# Patient Record
Sex: FEMALE | Race: WHITE | NOT HISPANIC OR LATINO | Employment: FULL TIME | URBAN - METROPOLITAN AREA
[De-identification: names, ages, dates, MRNs, and addresses within clinical notes are randomized per-mention and may not be internally consistent; named-entity substitution may affect disease eponyms.]

---

## 2023-02-16 PROBLEM — J30.9 ALLERGIC RHINITIS: Status: ACTIVE | Noted: 2023-02-16

## 2023-02-16 PROBLEM — R29.898 WEAKNESS OF LEFT LOWER EXTREMITY: Status: ACTIVE | Noted: 2023-02-16

## 2023-02-16 PROBLEM — F17.200 NICOTINE DEPENDENCE: Status: ACTIVE | Noted: 2023-02-16

## 2023-02-16 PROBLEM — M25.50 ARTHRALGIA OF MULTIPLE JOINTS: Status: ACTIVE | Noted: 2023-02-16

## 2023-02-16 PROBLEM — M25.521 ELBOW PAIN, RIGHT: Status: ACTIVE | Noted: 2023-02-16

## 2023-02-16 PROBLEM — E78.2 HYPERLIPEMIA, MIXED: Status: ACTIVE | Noted: 2023-02-16

## 2023-02-16 PROBLEM — E66.01 CLASS 3 SEVERE OBESITY DUE TO EXCESS CALORIES WITH BODY MASS INDEX (BMI) OF 45.0 TO 49.9 IN ADULT (MULTI): Status: ACTIVE | Noted: 2023-02-16

## 2023-02-16 PROBLEM — M25.562 LEFT KNEE PAIN: Status: ACTIVE | Noted: 2023-02-16

## 2023-02-16 PROBLEM — G47.33 OBSTRUCTIVE SLEEP APNEA: Status: ACTIVE | Noted: 2023-02-16

## 2023-02-16 PROBLEM — R63.2 POLYPHAGIA: Status: ACTIVE | Noted: 2023-02-16

## 2023-02-16 PROBLEM — S59.902A INJURY OF ELBOW, LEFT: Status: ACTIVE | Noted: 2023-02-16

## 2023-02-16 PROBLEM — E66.01 MORBID OBESITY WITH BMI OF 40.0-44.9, ADULT (MULTI): Status: ACTIVE | Noted: 2023-02-16

## 2023-02-16 PROBLEM — D35.00 ADRENAL ADENOMA: Status: ACTIVE | Noted: 2023-02-16

## 2023-02-16 PROBLEM — E66.813 CLASS 3 SEVERE OBESITY DUE TO EXCESS CALORIES WITH BODY MASS INDEX (BMI) OF 45.0 TO 49.9 IN ADULT: Status: ACTIVE | Noted: 2023-02-16

## 2023-02-16 PROBLEM — I28.8 ENLARGED PULMONARY ARTERY (MULTI): Status: ACTIVE | Noted: 2023-02-16

## 2023-02-16 PROBLEM — L30.1 DYSHIDROSIS: Status: ACTIVE | Noted: 2023-02-16

## 2023-02-16 PROBLEM — L20.9 ATOPIC DERMATITIS: Status: ACTIVE | Noted: 2023-02-16

## 2023-02-16 PROBLEM — R91.8 LUNG NODULES: Status: ACTIVE | Noted: 2023-02-16

## 2023-02-16 PROBLEM — E04.1 THYROID NODULE: Status: ACTIVE | Noted: 2023-02-16

## 2023-02-16 PROBLEM — I25.10 CORONARY ATHEROSCLEROSIS: Status: ACTIVE | Noted: 2023-02-16

## 2023-02-16 PROBLEM — R92.8 ABNORMAL MAMMOGRAM OF RIGHT BREAST: Status: ACTIVE | Noted: 2023-02-16

## 2023-02-16 RX ORDER — FLUTICASONE PROPIONATE 50 MCG
2 SPRAY, SUSPENSION (ML) NASAL NIGHTLY
COMMUNITY
Start: 2022-09-07 | End: 2023-10-19

## 2023-02-16 RX ORDER — ATORVASTATIN CALCIUM 10 MG/1
1 TABLET, FILM COATED ORAL DAILY
COMMUNITY
Start: 2022-04-26 | End: 2023-05-30

## 2023-02-16 RX ORDER — MELOXICAM 15 MG/1
1 TABLET ORAL DAILY PRN
COMMUNITY
Start: 2022-08-09 | End: 2023-10-19

## 2023-03-10 ENCOUNTER — APPOINTMENT (OUTPATIENT)
Dept: PRIMARY CARE | Facility: CLINIC | Age: 58
End: 2023-03-10
Payer: COMMERCIAL

## 2023-03-30 ENCOUNTER — OFFICE VISIT (OUTPATIENT)
Dept: PRIMARY CARE | Facility: CLINIC | Age: 58
End: 2023-03-30
Payer: COMMERCIAL

## 2023-03-30 ENCOUNTER — LAB (OUTPATIENT)
Dept: LAB | Facility: LAB | Age: 58
End: 2023-03-30
Payer: COMMERCIAL

## 2023-03-30 VITALS
SYSTOLIC BLOOD PRESSURE: 137 MMHG | HEIGHT: 66 IN | BODY MASS INDEX: 46.28 KG/M2 | DIASTOLIC BLOOD PRESSURE: 86 MMHG | WEIGHT: 288 LBS | TEMPERATURE: 98.3 F | OXYGEN SATURATION: 94 % | HEART RATE: 64 BPM

## 2023-03-30 DIAGNOSIS — G47.33 OBSTRUCTIVE SLEEP APNEA: ICD-10-CM

## 2023-03-30 DIAGNOSIS — E66.01 MORBID OBESITY (MULTI): ICD-10-CM

## 2023-03-30 DIAGNOSIS — Z00.00 ROUTINE GENERAL MEDICAL EXAMINATION AT A HEALTH CARE FACILITY: ICD-10-CM

## 2023-03-30 DIAGNOSIS — Z12.31 VISIT FOR SCREENING MAMMOGRAM: ICD-10-CM

## 2023-03-30 DIAGNOSIS — J30.9 ALLERGIC RHINITIS, UNSPECIFIED SEASONALITY, UNSPECIFIED TRIGGER: Primary | ICD-10-CM

## 2023-03-30 DIAGNOSIS — J30.9 ALLERGIC RHINITIS, UNSPECIFIED SEASONALITY, UNSPECIFIED TRIGGER: ICD-10-CM

## 2023-03-30 DIAGNOSIS — Z12.11 ENCOUNTER FOR COLORECTAL CANCER SCREENING: ICD-10-CM

## 2023-03-30 DIAGNOSIS — F17.210 CIGARETTE NICOTINE DEPENDENCE WITHOUT COMPLICATION: ICD-10-CM

## 2023-03-30 DIAGNOSIS — E78.2 HYPERLIPEMIA, MIXED: ICD-10-CM

## 2023-03-30 DIAGNOSIS — I25.10 ATHEROSCLEROSIS OF NATIVE CORONARY ARTERY OF NATIVE HEART WITHOUT ANGINA PECTORIS: ICD-10-CM

## 2023-03-30 DIAGNOSIS — Z12.12 ENCOUNTER FOR COLORECTAL CANCER SCREENING: ICD-10-CM

## 2023-03-30 DIAGNOSIS — L91.8 ACQUIRED SKIN TAG: ICD-10-CM

## 2023-03-30 DIAGNOSIS — L30.1 DYSHIDROSIS: ICD-10-CM

## 2023-03-30 LAB
ALANINE AMINOTRANSFERASE (SGPT) (U/L) IN SER/PLAS: 14 U/L (ref 7–45)
ALBUMIN (G/DL) IN SER/PLAS: 4 G/DL (ref 3.4–5)
ALKALINE PHOSPHATASE (U/L) IN SER/PLAS: 71 U/L (ref 33–110)
ANION GAP IN SER/PLAS: 11 MMOL/L (ref 10–20)
ASPARTATE AMINOTRANSFERASE (SGOT) (U/L) IN SER/PLAS: 13 U/L (ref 9–39)
BILIRUBIN TOTAL (MG/DL) IN SER/PLAS: 0.3 MG/DL (ref 0–1.2)
CALCIUM (MG/DL) IN SER/PLAS: 8.9 MG/DL (ref 8.6–10.3)
CARBON DIOXIDE, TOTAL (MMOL/L) IN SER/PLAS: 27 MMOL/L (ref 21–32)
CHLORIDE (MMOL/L) IN SER/PLAS: 106 MMOL/L (ref 98–107)
CHOLESTEROL (MG/DL) IN SER/PLAS: 177 MG/DL (ref 0–199)
CHOLESTEROL IN HDL (MG/DL) IN SER/PLAS: 60.1 MG/DL
CHOLESTEROL/HDL RATIO: 2.9
CREATININE (MG/DL) IN SER/PLAS: 0.75 MG/DL (ref 0.5–1.05)
ERYTHROCYTE DISTRIBUTION WIDTH (RATIO) BY AUTOMATED COUNT: 14.9 % (ref 11.5–14.5)
ERYTHROCYTE MEAN CORPUSCULAR HEMOGLOBIN CONCENTRATION (G/DL) BY AUTOMATED: 32.5 G/DL (ref 32–36)
ERYTHROCYTE MEAN CORPUSCULAR VOLUME (FL) BY AUTOMATED COUNT: 92 FL (ref 80–100)
ERYTHROCYTES (10*6/UL) IN BLOOD BY AUTOMATED COUNT: 4.59 X10E12/L (ref 4–5.2)
GFR FEMALE: >90 ML/MIN/1.73M2
GLUCOSE (MG/DL) IN SER/PLAS: 101 MG/DL (ref 74–99)
HEMATOCRIT (%) IN BLOOD BY AUTOMATED COUNT: 42.1 % (ref 36–46)
HEMOGLOBIN (G/DL) IN BLOOD: 13.7 G/DL (ref 12–16)
LDL: 98 MG/DL (ref 0–99)
LEUKOCYTES (10*3/UL) IN BLOOD BY AUTOMATED COUNT: 15.9 X10E9/L (ref 4.4–11.3)
PLATELETS (10*3/UL) IN BLOOD AUTOMATED COUNT: 229 X10E9/L (ref 150–450)
POTASSIUM (MMOL/L) IN SER/PLAS: 4.4 MMOL/L (ref 3.5–5.3)
PROTEIN TOTAL: 6.4 G/DL (ref 6.4–8.2)
SODIUM (MMOL/L) IN SER/PLAS: 140 MMOL/L (ref 136–145)
THYROTROPIN (MIU/L) IN SER/PLAS BY DETECTION LIMIT <= 0.05 MIU/L: 0.57 MIU/L (ref 0.44–3.98)
TRIGLYCERIDE (MG/DL) IN SER/PLAS: 93 MG/DL (ref 0–149)
UREA NITROGEN (MG/DL) IN SER/PLAS: 22 MG/DL (ref 6–23)
VLDL: 19 MG/DL (ref 0–40)

## 2023-03-30 PROCEDURE — 99214 OFFICE O/P EST MOD 30 MIN: CPT | Performed by: FAMILY MEDICINE

## 2023-03-30 PROCEDURE — 80061 LIPID PANEL: CPT

## 2023-03-30 PROCEDURE — 36415 COLL VENOUS BLD VENIPUNCTURE: CPT

## 2023-03-30 PROCEDURE — 99396 PREV VISIT EST AGE 40-64: CPT | Performed by: FAMILY MEDICINE

## 2023-03-30 PROCEDURE — 84443 ASSAY THYROID STIM HORMONE: CPT

## 2023-03-30 PROCEDURE — 80053 COMPREHEN METABOLIC PANEL: CPT

## 2023-03-30 PROCEDURE — 85027 COMPLETE CBC AUTOMATED: CPT

## 2023-03-30 RX ORDER — AMMONIUM LACTATE 12 G/100G
LOTION TOPICAL 2 TIMES DAILY PRN
Qty: 120 ML | Refills: 3 | Status: SHIPPED | OUTPATIENT
Start: 2023-03-30 | End: 2023-10-19

## 2023-03-30 NOTE — PROGRESS NOTES
Subjective   Patient ID: Rashida Whitfield is a 57 y.o. female who presents for Annual Exam. Fasting for labs.    Has skin spot on eyelid   Obstructive sleep apnea: Seeing sleep medicine, on CPAP    Arthralgias, osteoarthritis  Coronary artery disease  on lipitor   Tobacco abuse    Allergic rhinitis    Anxiety lots of stress at work feels overwhelming with work and personal life   Overeating a lot , seeing counseling   No depression   Prozac, zoloft hasn't work in past     Working on cpap trying to get back on it ,  trouble with it     Smoking 1/2 ppd   No CP or sob        Dry skin on hands and and feet,  several creams in past       ROS :    Any eye problems:    N  Frequent nasal congestion or sneezing:  N  Difficulty hearing:  N  Ear problems:   N  Asthma or wheezing:   N  Frequent cough:   N  Shortness of breath:N  Hemoptysis: N  Hx of TB: N  High blood pressure: N  Heart disease: N  Heart murmur:y  Chest pain or pressure with exertion:N  Leg pains with walking up hill: N  Fast heartbeat or palpitations:N  Varicose veins: y  Difficulty swallowing foods or liquids: N  Abdominal pains: N  Frequent indigestion or heartburn: y  Constipation: y  Diarrhea or loose stools: N  Weight changes recently: y  Change in bowel movements: N  An ulcer: N  Black stools: N  Jaundice, hepatitis or liver problems: N  Gallstones or gallbladder problems: N  Stomach or intestinal problems: N  Vomited blood : N  Blood in bowel movements: N  Sickle cell trait  or Anemia: N  Been refused as a blood donor: N  Problems with her kidney, bladder, or prostate: N  Loss of control of your urine: y  Pain or burning with urination: N  Blood in her urine: N  Trouble starting flow of urine: N  Frequent urination at night: y  History of venereal disease: N  Any skin problems: y  Diabetes: N  Thyroid disease: N  Frequent back pain: N  Pain or swelling around joints: y  Broken any bones: y  Frequent headaches: y  Dizziness: N  Have you ever had Seizures or  convulsions: N  Have you ever temporarily lost control of your hand or foot : N   Had a stroke or been paralyzed : N  Temporarily lost your ability to speak: N  Fainted or lost consciousness: N  Hallucinations: N  Nervousness: y  Do you take medications for your nerves: N  Trouble falling asleep or staying asleep: y  Do you feel tired even after a good night sleep: y  Do you feel down in the dumps or depressed: N  Frequent crying: N  Using alcohol excessively: N  Any street drug use : N  Do have any other medical problems that are concerns : y growth on left eyelid and anxiety    Review of Systems    Objective   There were no vitals taken for this visit.    Physical Exam  Constitutional:       General: She is not in acute distress.     Appearance: Normal appearance.   HENT:      Head: Normocephalic and atraumatic.      Right Ear: Tympanic membrane and ear canal normal.      Left Ear: Tympanic membrane and ear canal normal.      Mouth/Throat:      Mouth: Mucous membranes are moist.   Eyes:      Conjunctiva/sclera: Conjunctivae normal.      Pupils: Pupils are equal, round, and reactive to light.      Comments: Left upper lid with skin tag on border    Neck:      Vascular: No carotid bruit.   Cardiovascular:      Rate and Rhythm: Normal rate and regular rhythm.      Heart sounds: No murmur heard.  Pulmonary:      Effort: Pulmonary effort is normal.      Breath sounds: Normal breath sounds. No wheezing or rhonchi.   Abdominal:      General: Bowel sounds are normal.      Palpations: Abdomen is soft.   Musculoskeletal:         General: No swelling.      Cervical back: No rigidity.   Lymphadenopathy:      Cervical: No cervical adenopathy.   Skin:     General: Skin is warm and dry.      Findings: No rash.   Neurological:      Mental Status: She is alert.         Assessment/Plan   Problem List Items Addressed This Visit       Allergic rhinitis - Primary    Coronary atherosclerosis    Dyshidrosis    Hyperlipemia, mixed     Nicotine dependence    Obstructive sleep apnea     Other Visit Diagnoses       Routine general medical examination at a health care facility        Morbid obesity (CMS/[facility])        Visit for screening mammogram        Acquired skin tag

## 2023-03-30 NOTE — PATIENT INSTRUCTIONS
Recommend Dr Neftali Henderson :  for skin tag eyelids     Get your blood work as ordered.  You should hear from our office with results whether they are normal are not within a few days.  Please call the office if you do not hear from us.     You should be getting cardiovascular exercise 3-5 times per week for 30-45 minutes.  This includes exercises such as running, brisk walking, biking or swimming.    CT screening:  I discussed the patient's smoking history and risk for lung cancer.  I discussed the CT scan for screening.  Including discussion of [place] and benefits of screening, follow-up diagnostic testing, and benefit of early detection.  The CAT scan of the lungs oftentimes will  cancer earlier and may result any better outcome than waiting for a lung cancer becomes symptomatic.  There are sometimes however people that do have false positive tests as well.  I gave the patient order for the CAT scan for screening purposes.  Determined the patient fits in the criteria based on the age range and smoking history to receive the CT     Quit Smoking   It is very important that you quit smoking.  Even if you have smoked for a long time there are still many physical benefits to stopping smoking.  There are also numerous conditions that are worsened by smoking such as cancer, lung disease, emphysema, and heart disease to name a few.  Pick a date to quit.  On your quit date, get rid of everything that has to do with smoking.   If you were given a medication to quit smoking, such as Chantix or Wellbutrin it is recommended to start the week prior to quitting.  If you are using nicotine replacement to quit smoking, it is very important you do not smoke while you have a patch on or if using nicotine gum.  That can give you a toxic amount of nicotine in your system and can be dangerous to your heart.  Avoid social settings where you may be around smokers.  Get your family and friends' support.  Call the [place] Tobacco Quit  Line at 1-453.781.8265 for help and guidance.  You can quit smoking if you really want to.     It is important that you get your weight under control.  There are several things you can do to try and lose weight.  You should be getting 30-45 minutes of cardiovascular exercise 3-5 times per week, activities such as running and jogging treadmill swimming and brisk walking are helpful.  You will also need to watch your portion control, decrease calorie intake overall.  Following a low carbohydrate diet is the most successful way to lose weight and take it off long-term.     In order to achieve weight loss it is important that you track exactly what your calorie intake is during the day.  I usually recommend doing some sort of formal program or [place]ia. there are lot of good Apps out there to help you follow your calorie intake such as weight watchers, Noom, Fitbit.  If you have a BMI  (height per weight ratio)  that is over 40 or over 35 with risk factors such as diabetes, heart disease, high blood pressure or hypertension you may qualify for more intensive weight loss options.   has a comprehensive weight loss program which I can give you a referral for to aggressively work on weight loss through a dietitian, possible medications and possible surgical interventions.  There are some weight loss medications that do work well such as the GLP2 medications  : Saxenda and Wegovy, these medications are injectables but worked very well  Another medication called Contrave can help with weight loss.  I would recommend checking with your insurance to see what the cost of these medications would be for you and let me know if you would like to proceed with these medications.     Anxiety: Continue counseling, consider psychiatry if medications needed

## 2023-03-31 ENCOUNTER — TELEPHONE (OUTPATIENT)
Dept: PRIMARY CARE | Facility: CLINIC | Age: 58
End: 2023-03-31
Payer: COMMERCIAL

## 2023-03-31 DIAGNOSIS — D72.829 LEUKOCYTOSIS, UNSPECIFIED TYPE: Primary | ICD-10-CM

## 2023-03-31 NOTE — RESULT ENCOUNTER NOTE
Please notify pt of lab results she has an elevated white blood cell count which usually goes along with infection, let me know if any signs of infection, sometimes this can be from inflammation I want a repeat a CBC next week  Rest of blood work generally looks good: Blood count, thyroid, cholesterol

## 2023-03-31 NOTE — TELEPHONE ENCOUNTER
----- Message from Yin Hamm MD sent at 3/31/2023  7:49 AM EDT -----  Please notify pt of lab results she has an elevated white blood cell count which usually goes along with infection, let me know if any signs of infection, sometimes this can be from inflammation I want a repeat a CBC next week  Rest of blood work generally looks good: Blood count, thyroid, cholesterol

## 2023-03-31 NOTE — TELEPHONE ENCOUNTER
Pt informed of lab results.  Pt WBC are elevated, pt does feel like she is getting sick.  Pt will call on Monday and will have repeat lab in 1 week.

## 2023-04-05 ENCOUNTER — LAB (OUTPATIENT)
Dept: LAB | Facility: LAB | Age: 58
End: 2023-04-05
Payer: COMMERCIAL

## 2023-04-05 ENCOUNTER — TELEPHONE (OUTPATIENT)
Dept: PRIMARY CARE | Facility: CLINIC | Age: 58
End: 2023-04-05

## 2023-04-05 DIAGNOSIS — D72.829 LEUKOCYTOSIS, UNSPECIFIED TYPE: ICD-10-CM

## 2023-04-05 LAB
ERYTHROCYTE DISTRIBUTION WIDTH (RATIO) BY AUTOMATED COUNT: 14.7 % (ref 11.5–14.5)
ERYTHROCYTE MEAN CORPUSCULAR HEMOGLOBIN CONCENTRATION (G/DL) BY AUTOMATED: 32.6 G/DL (ref 32–36)
ERYTHROCYTE MEAN CORPUSCULAR VOLUME (FL) BY AUTOMATED COUNT: 93 FL (ref 80–100)
ERYTHROCYTES (10*6/UL) IN BLOOD BY AUTOMATED COUNT: 4.52 X10E12/L (ref 4–5.2)
HEMATOCRIT (%) IN BLOOD BY AUTOMATED COUNT: 42 % (ref 36–46)
HEMOGLOBIN (G/DL) IN BLOOD: 13.7 G/DL (ref 12–16)
LEUKOCYTES (10*3/UL) IN BLOOD BY AUTOMATED COUNT: 9.3 X10E9/L (ref 4.4–11.3)
PLATELETS (10*3/UL) IN BLOOD AUTOMATED COUNT: 239 X10E9/L (ref 150–450)

## 2023-04-05 PROCEDURE — 36415 COLL VENOUS BLD VENIPUNCTURE: CPT

## 2023-04-05 PROCEDURE — 85027 COMPLETE CBC AUTOMATED: CPT

## 2023-04-05 NOTE — TELEPHONE ENCOUNTER
Result Communication    Resulted Orders   CBC   Result Value Ref Range    WBC 9.3 4.4 - 11.3 x10E9/L    RBC 4.52 4.00 - 5.20 x10E12/L    Hemoglobin 13.7 12.0 - 16.0 g/dL    Hematocrit 42.0 36.0 - 46.0 %    MCV 93 80 - 100 fL    MCHC 32.6 32.0 - 36.0 g/dL    Platelets 239 150 - 450 x10E9/L    RDW 14.7 (H) 11.5 - 14.5 %       4:46 PM      Results were successfully communicated with the patient and they acknowledged their understanding.

## 2023-04-20 ENCOUNTER — TELEPHONE (OUTPATIENT)
Dept: PRIMARY CARE | Facility: CLINIC | Age: 58
End: 2023-04-20
Payer: COMMERCIAL

## 2023-04-20 LAB — NONINV COLON CA DNA+OCC BLD SCRN STL QL: NEGATIVE

## 2023-04-20 NOTE — TELEPHONE ENCOUNTER
Result Communication    Resulted Orders   Cologuard® colon cancer screening   Result Value Ref Range    NONINV COLON CA DNA+OCC BLD SCRN STL QL Negative Negative      Comment:        NEGATIVE TEST RESULT. A negative Cologuard result indicates a low likelihood that a colorectal cancer (CRC) or advanced adenoma (adenomatous polyps with more advanced pre-malignant features)  is present. The chance that a person with a negative Cologuard test has a colorectal cancer is less than 1 in 1500 (negative predictive value >99.9%) or has an  advanced adenoma is less than  5.3% (negative predictive value 94.7%). These data are based on a prospective cross-sectional study of 10,000 individuals at average risk for colorectal cancer who were screened with both Cologuard and colonoscopy. (Valerie ELIZONDO. et al, N Engl J Med 2014;370(14):1756-4762) The normal value (reference range) for this assay is negative.    COLOGUARD RE-SCREENING RECOMMENDATION: Periodic colorectal cancer screening is an important part of preventive healthcare for asymptomatic individuals at average risk for colorectal cancer.  Following a negative Cologuard result, the American Cancer Society and U.S.  Multi-Society Task Force screening guidelines recommend a Cologuard re-screening interval of 3 years.   References: American Cancer Society Guideline for Colorectal Cancer Screening: https://www.cancer.org/cancer/colon-rectal-cancer/wtgasyvjm-wjoahvklm-qejebcm/acs-recommendations.html.; Bahman IQBAL, Mary MAYS, Renetta STONEK, Colorectal Cancer Screening: Recommendations for Physicians and Patients from the U.S. Multi-Society Task Force on Colorectal Cancer Screening , Am J Gastroenterology 2017; 112:1465-3824.    TEST DESCRIPTION: Composite algorithmic analysis of stool DNA-biomarkers with hemoglobin immunoassay.   Quantitative values of individual biomarkers are not reportable and are not associated with individual biomarker result reference ranges. Cologuard is intended  for colorectal cancer screening of adults of either sex, 45 years or older, who are at average-risk for colorectal cancer (CRC). Cologuard has been approved for use by the U.S. FDA. The performance of Cologuard was  established in a cross sectional study of average-risk adults aged 50-84. Cologuard performance in patients ages 45 to 49 years was estimated by sub-group analysis of near-age groups. Colonoscopies performed for a positive result may find as the most clinically significant lesion: colorectal cancer [4.0%], advanced adenoma (including sessile serrated polyps greater than or equal to 1cm diameter) [20%] or non- advanced adenoma [31%]; or no colorectal neoplasia [45%]. These estimates are derived from a prospective cross-sectional screening study of 10,000 individuals at average risk for colorectal cancer who were screened with both Cologuard and colonoscopy. (Valerie Oliveira al, N Engl J Med 2014;370(14):5341-4725.) Cologuard may produce a false negative or false positive result (no colorectal cancer or precancerous polyp present at colonoscopy follow up). A negative Cologuard test result does not guarantee the absence of CRC or advanced adenoma (pre-cancer). The current Cologuard  screening interval is every 3 years. (American Cancer Society and U.S. Multi-Society Task Force). Cologuard performance data in a 10,000 patient pivotal study using colonoscopy as the reference method can be accessed at the following location: [email or web address].[email or web address]/results. Additional description of the Cologuard test process, warnings and precautions can be found at www.[employer]mn[employer].com.         2:32 PM      Results were successfully communicated with the patient and they acknowledged their understanding.

## 2023-05-30 DIAGNOSIS — E78.2 HYPERLIPEMIA, MIXED: Primary | ICD-10-CM

## 2023-05-30 PROBLEM — H02.826: Status: ACTIVE | Noted: 2023-05-30

## 2023-05-30 PROBLEM — J32.9 CHRONIC SINUSITIS: Status: ACTIVE | Noted: 2023-05-30

## 2023-05-30 RX ORDER — ATORVASTATIN CALCIUM 10 MG/1
10 TABLET, FILM COATED ORAL DAILY
Qty: 90 TABLET | Refills: 2 | Status: SHIPPED | OUTPATIENT
Start: 2023-05-30 | End: 2024-03-04

## 2023-06-02 ENCOUNTER — TELEPHONE (OUTPATIENT)
Dept: PRIMARY CARE | Facility: CLINIC | Age: 58
End: 2023-06-02
Payer: COMMERCIAL

## 2023-06-02 DIAGNOSIS — R93.89 ABNORMAL CHEST CT: Primary | ICD-10-CM

## 2023-06-02 NOTE — RESULT ENCOUNTER NOTE
Please notify pt of radiology results: There are no concerning lung nodules, she does have emphysema, needs to quit smoking  There is a little asymmetry on her trachea, this could be a little fold of tissue but it needs close follow-up to make sure it resolves, she needs a repeat CAT scan of the chest in 3 months, order inputted  Let me know if she has any questions

## 2023-06-02 NOTE — TELEPHONE ENCOUNTER
----- Message from Yin Hamm MD sent at 6/2/2023 11:03 AM EDT -----  Please notify pt of radiology results: There are no concerning lung nodules, she does have emphysema, needs to quit smoking  There is a little asymmetry on her trachea, this could be a little fold of tissue but it needs close follow-up to make sure it resolves, she needs a repeat CAT scan of the chest in 3 months, order inputted  Let me know if she has any questions

## 2023-06-02 NOTE — TELEPHONE ENCOUNTER
Result Communication    Resulted Orders   CT lung screening low dose    Narrative    Interpreted By:  CHANDNI GARCIA MD  MRN: [id number]  Patient Name: MARIA EUGENIA PASTOR     STUDY:  TH CT CHEST LOW DOSE FOR LUNG SCREENING WO CONTRAST;  6/1/2023 8:54 am     INDICATION:  screen.     COMPARISON:  None.     ACCESSION NUMBER(S):  [id number]     ORDERING CLINICIAN:  NICOLE COOPER     TECHNIQUE:  Helical data acquisition of the chest was obtained without IV  contrast material.  Images were reformatted in axial, coronal, and  sagittal planes.     FINDINGS:  LUNGS AND AIRWAYS:  There is asymmetricright lateral tracheal wall thickening.     There is similar bilateral upper lung predominant centrilobular and  paraseptal emphysema.There is no focal consolidation, pleural  effusion, or pneumothorax.     There are no suspicious parenchymal lung nodules.        MEDIASTINUM AND YARED, LOWER NECK AND AXILLA:  The visualized thyroid gland is within normal limits.  No evidence of thoracic lymphadenopathy by CT criteria.  Esophagus appears within normal limits as seen.     HEART AND VESSELS:  There is mild atherosclerotic calcification of the thoracic aorta.  Main pulmonary artery and its branches are normal in caliber.  There is moderate coronary artery calcifications.  The cardiac chambers are not enlarged.  There is no pericardial effusion seen.     UPPER ABDOMEN:  There is stable gland enlargement consistent with adrenal adenoma.           CHEST WALL AND OSSEOUS STRUCTURES:  Chest wall is within normal limits.  No acute osseous pathology.There are no suspicious osseous lesions.       Impression    1. There are no suspicious parenchymal lung nodules.  2. There is subtle asymmetric right lateral wall tracheal thickening  which may represent edema versus mucosal redundancy recommend a  three-month low-dose chest CT to confirm benign nature of this finding     COMMUNICATION  A critical result communication was sent to Dr. Cooper on  06/01/2023  at 8:06 p.m. using the [employer] system.        LUNG RADS CATEGORY:  Lung-RADS 4A, NO PET CT (Suspicious): Follow up in 3 months,  CT  Chest Lung Ca Screen Follow up LR3/LR4 DIAGNOSTIC Low Dose,  recommended as per American College of Radiology Guidelines Lung-RADS  Version 2022. Recommend F/U with Pulmonologist.          2:21 PM      Results were successfully communicated with the patient and they acknowledged their understanding.

## 2023-06-15 ENCOUNTER — OFFICE VISIT (OUTPATIENT)
Dept: PRIMARY CARE | Facility: CLINIC | Age: 58
End: 2023-06-15
Payer: COMMERCIAL

## 2023-06-15 VITALS
SYSTOLIC BLOOD PRESSURE: 119 MMHG | WEIGHT: 293 LBS | HEIGHT: 66 IN | OXYGEN SATURATION: 94 % | DIASTOLIC BLOOD PRESSURE: 79 MMHG | BODY MASS INDEX: 47.09 KG/M2 | TEMPERATURE: 98.2 F | HEART RATE: 84 BPM

## 2023-06-15 DIAGNOSIS — E78.2 HYPERLIPEMIA, MIXED: Primary | ICD-10-CM

## 2023-06-15 DIAGNOSIS — R73.9 HYPERGLYCEMIA: ICD-10-CM

## 2023-06-15 DIAGNOSIS — E66.01 CLASS 3 SEVERE OBESITY DUE TO EXCESS CALORIES WITH SERIOUS COMORBIDITY AND BODY MASS INDEX (BMI) OF 45.0 TO 49.9 IN ADULT (MULTI): ICD-10-CM

## 2023-06-15 DIAGNOSIS — Z13.820 SCREENING FOR OSTEOPOROSIS: ICD-10-CM

## 2023-06-15 DIAGNOSIS — E88.810 METABOLIC SYNDROME: ICD-10-CM

## 2023-06-15 PROBLEM — I28.8 ENLARGED PULMONARY ARTERY (MULTI): Status: RESOLVED | Noted: 2023-02-16 | Resolved: 2023-06-15

## 2023-06-15 LAB — POC HEMOGLOBIN A1C: 6.1 % (ref 4.2–6.5)

## 2023-06-15 PROCEDURE — 83036 HEMOGLOBIN GLYCOSYLATED A1C: CPT | Performed by: FAMILY MEDICINE

## 2023-06-15 PROCEDURE — 3008F BODY MASS INDEX DOCD: CPT | Performed by: FAMILY MEDICINE

## 2023-06-15 PROCEDURE — 99214 OFFICE O/P EST MOD 30 MIN: CPT | Performed by: FAMILY MEDICINE

## 2023-06-15 NOTE — PROGRESS NOTES
"Subjective   Patient ID: Rashida Whitfield is a 57 y.o. female who presents for Discuss meds for weight loss. Seeing Dr. Johnson for her left knee/ leg pain; advised she needed to lose weight. Scheduled for a possible cortisone injection on 6/28/23. In so much pain with her left leg.     Patient having left knee pain, seeing orthopedics  Is getting an injection  Needs knee replacement in both knees   Has to lose weight to get knee replacement   Just lost 10 #     Having a lot of pain   Was taking mobic     Has done weight watchers in past,   Hard time cooking for herself     Needs to lose weight to help with the knee pain         Review of Systems    Objective   Ht 1.676 m (5' 6\")   Wt 134 kg (294 lb 6 oz)   BMI 47.51 kg/m²     Physical Exam  Constitutional:       Appearance: Normal appearance. She is well-developed.   Cardiovascular:      Rate and Rhythm: Normal rate and regular rhythm.      Heart sounds: Normal heart sounds. No murmur heard.  Pulmonary:      Effort: Pulmonary effort is normal.      Breath sounds: Normal breath sounds.   Neurological:      General: No focal deficit present.      Mental Status: She is alert.         Assessment/Plan   Problem List Items Addressed This Visit       Hyperlipemia, mixed - Primary    Class 3 severe obesity due to excess calories with body mass index (BMI) of 45.0 to 49.9 in adult (CMS/HCC)          "

## 2023-07-13 PROBLEM — M25.561 RIGHT KNEE PAIN: Status: ACTIVE | Noted: 2023-07-13

## 2023-07-14 ENCOUNTER — OFFICE VISIT (OUTPATIENT)
Dept: PRIMARY CARE | Facility: CLINIC | Age: 58
End: 2023-07-14
Payer: COMMERCIAL

## 2023-07-14 VITALS
TEMPERATURE: 98.3 F | SYSTOLIC BLOOD PRESSURE: 123 MMHG | OXYGEN SATURATION: 95 % | HEIGHT: 66 IN | HEART RATE: 66 BPM | DIASTOLIC BLOOD PRESSURE: 83 MMHG | BODY MASS INDEX: 45 KG/M2 | WEIGHT: 280 LBS

## 2023-07-14 DIAGNOSIS — R19.7 DIARRHEA, UNSPECIFIED TYPE: ICD-10-CM

## 2023-07-14 DIAGNOSIS — E88.810 METABOLIC SYNDROME: ICD-10-CM

## 2023-07-14 DIAGNOSIS — E66.01 MORBID OBESITY (MULTI): ICD-10-CM

## 2023-07-14 DIAGNOSIS — E66.01 CLASS 3 SEVERE OBESITY DUE TO EXCESS CALORIES WITH SERIOUS COMORBIDITY AND BODY MASS INDEX (BMI) OF 45.0 TO 49.9 IN ADULT (MULTI): Primary | ICD-10-CM

## 2023-07-14 PROCEDURE — 99213 OFFICE O/P EST LOW 20 MIN: CPT | Performed by: FAMILY MEDICINE

## 2023-07-14 PROCEDURE — 3008F BODY MASS INDEX DOCD: CPT | Performed by: FAMILY MEDICINE

## 2023-07-14 NOTE — PATIENT INSTRUCTIONS
GLP-1 medications are medications that work to help you lose weight.  Most commonly prescribed are Rybelsus, Mounjaro, Wegovy.  These medications work to cause weight loss through decreased appetite as well as improving metabolism and slowing down your GI tract.  These are indicated if you have a BMI of 30 or greater, or a BMI over 27 with health related complications such as diabetes, high blood pressure, high cholesterol or sleep apnea.  These medications are an injection form and a pen which can be injected into the thighs or abdomen.  They commonly can cause decreased appetite or nausea.  Sometimes they can cause abdominal pain.  It is important to eat a healthy diet while you are taking these medications, if you eat large meals or high fat meals such as fried food it will increase the likelihood of nausea, vomiting or severe abdominal pain.  Stop the medications if you have significant side effects.  It is important that you see your doctor regularly.  Usually with these medications you need to be seen monthly for evaluation of side effects and weight checks.

## 2023-07-14 NOTE — PROGRESS NOTES
"Subjective   Patient ID: Rashida Whitfield is a 57 y.o. female who presents for One month med review since starting the Ozempic. States she developed HA, and abdominal pains in addition to indigestion. Appetite has decreased. Pt also states she may have caught a virus this Monday; had diarrhea pretty bad for the day. Pt has found upon eating greasy fried food she does not feel well since being on the Ozempic.     Needs knee replacement   Got cortisone shot   Took 4 days   Diarrhea this week , getting  better     Some nausea   Stomach tightness          Review of Systems    Objective   /83   Pulse 66   Temp 36.8 °C (98.3 °F)   Ht 1.676 m (5' 6\")   Wt 127 kg (280 lb)   SpO2 95%   BMI 45.19 kg/m²     Physical Exam  Constitutional:       Appearance: Normal appearance. She is well-developed.   Cardiovascular:      Rate and Rhythm: Normal rate and regular rhythm.      Heart sounds: No murmur heard.  Neurological:      General: No focal deficit present.      Mental Status: She is alert.         Assessment/Plan   Problem List Items Addressed This Visit       Class 3 severe obesity due to excess calories with body mass index (BMI) of 45.0 to 49.9 in adult (CMS/HCC) - Primary     Other Visit Diagnoses       Morbid obesity (CMS/[facility])        Metabolic syndrome        Diarrhea, unspecified type                   "

## 2023-07-21 ENCOUNTER — TELEPHONE (OUTPATIENT)
Dept: PRIMARY CARE | Facility: CLINIC | Age: 58
End: 2023-07-21
Payer: COMMERCIAL

## 2023-07-21 NOTE — RESULT ENCOUNTER NOTE
Your mammogram results were normal.  Follow-up with yearly mammogram or as directed by your doctor.  You have dense breast tissue.  This makes the accuracy of mammogram a little lower with regards to picking up breast cancer.  It is recommended that you consider getting another evaluation for your breasts call the fast MRI.  This is a quick MRI study that looks at the breast and is more accurate than the mammogram but does not replace routine mammograms.  This test is self-pay, prices are usually around $250.   You can get this at several  facilities including [facility].  If you're interested please let us know and we can input an order for you.

## 2023-07-21 NOTE — TELEPHONE ENCOUNTER
Result Communication    Resulted Orders   XR DEXA bone density    Narrative    Interpreted By:  NINA ORTIZ MD  MRN: [id number]  Patient Name: MARIA EUGENIA PASTOR     STUDY:  BONE DENSITY, DEXA 1 OR MORE SITES: AXIAL SKELETN7/20/2023 10:09 am     INDICATION:  screen.  The patient is a 58 y/o  year old F.     COMPARISON:  None.     ACCESSION NUMBER(S):  [id number]     ORDERING CLINICIAN:  NICOLE COOPER     TECHNIQUE:  BONE DENSITY, DEXA 1 OR MORE SITES: AXIAL SKELETN     FINDINGS:  SPINE L1-L4  Bone Mineral Density: 1.048  T-Score 0.0  Z-Score 1.3  Classification:  Normal  Bone Mineral Density change vs baseline:  Not reported  Bone Mineral Density change vs previous: Not reported     LEFT FEMUR -TOTAL  Bone Mineral Density: 1.196  T-Score 2.1   Z-Score  2.9  Classification:  Normal  Bone Mineral Density change vs baseline: Not reported  Bone Mineral Density change vs previous: Not reported     LEFT FEMUR -NECK  Bone Mineral Density: 0.863  T-Score 0.1  Z-Score 1.3  Classification:  Normal        World Health Organization (WHO) criteria for post-menopausal,   Women:  Normal:         T-score at or above -1 SD  Osteopenia:   T-score between -1 and -2.5 SD  Osteoporosis: T-score at or below -2.5 SD        10-year Fracture Risk:  Major Osteoporotic Fracture  Not reported  Hip Fracture                        Not reported     Note:  If no FRAX score is reported, it is because:  Some T-score for Spine Total or Hip Total or Femoral Neck at or below  -2.5     Vertebral Deformity Assessment: Exam Date - 7/20/2023 10:09 am  -----------------------------------------------------------------  Vertebral Level                      Impression  -----------------------------------------------------------------  T4                                        Not reported  T5                                        Not reported  T6                                        Not reported  T7                                        Not  reported  T8                                        Not reported  T9                                        Not reported  T10                                      Not reported  T11                                      Not reported  T12                                      Not reported  L1                                        Not reported  L2                                        Not reported  L3                                        Not reported  L4                                        Not reported  -----------------------------------------------------------------  A spine fracture indicates 5X risk for subsequent spine fracture  and 2X risk for subsequent hip fracture.        This exam was performed at [facility].  Patient ID:[id number]  YOB: 1965  Height:170.18  Gender:F  Exam Date:7/20/2023  Weight:127.614819  Indications:screen       Impression    DEXA: Spine According to World Health Organization criteria,  classification is  normal.     Followup recommended in 2 years or sooner as clinically warranted.     DEXA: Left hip According to World Health Organization criteria,  classification is  normal.     Followup recommended in 2 years or sooner as clinically warranted.     VFA:  NO VERTEBRAL FRACTURES WERE SEEN.     All images and detailed analysis are available on the  Radiology  PACS.       10:52 AM      Results were successfully communicated with the patient and they acknowledged their understanding.

## 2023-08-14 DIAGNOSIS — E88.810 METABOLIC SYNDROME: ICD-10-CM

## 2023-08-14 DIAGNOSIS — E66.01 CLASS 3 SEVERE OBESITY DUE TO EXCESS CALORIES WITH SERIOUS COMORBIDITY AND BODY MASS INDEX (BMI) OF 45.0 TO 49.9 IN ADULT (MULTI): ICD-10-CM

## 2023-08-14 DIAGNOSIS — R73.9 HYPERGLYCEMIA: ICD-10-CM

## 2023-08-16 RX ORDER — SEMAGLUTIDE 0.68 MG/ML
INJECTION, SOLUTION SUBCUTANEOUS
Qty: 3 ML | Refills: 0 | Status: SHIPPED | OUTPATIENT
Start: 2023-08-16 | End: 2023-10-19

## 2023-08-22 ENCOUNTER — TELEPHONE (OUTPATIENT)
Dept: PRIMARY CARE | Facility: CLINIC | Age: 58
End: 2023-08-22
Payer: COMMERCIAL

## 2023-08-28 ENCOUNTER — TELEPHONE (OUTPATIENT)
Dept: PRIMARY CARE | Facility: CLINIC | Age: 58
End: 2023-08-28

## 2023-08-28 NOTE — TELEPHONE ENCOUNTER
PT states the insurance is not covering the 3rd refill of the Ozempic. Pt states she is suppose to increase to .5mg and pt is asking if the prior auth has been completed. Please advise pt.

## 2023-09-14 ENCOUNTER — TELEPHONE (OUTPATIENT)
Dept: PRIMARY CARE | Facility: CLINIC | Age: 58
End: 2023-09-14
Payer: COMMERCIAL

## 2023-09-14 NOTE — TELEPHONE ENCOUNTER
Result Communication    Resulted Orders   CT lung screening follow up CT chest wo IV contrast    Narrative    Interpreted By:  CHANDNI GARCIA MD  MRN: [id number]  Patient Name: MARIA EUGENIA PASTOR     STUDY:  CT CHEST LUNG CA SCREEN FOLLOW UP LR3/LR4  DIAGNOSTIC LOW DOSE;  9/12/2023 9:04 am     INDICATION:  repeat abnl trachea.     COMPARISON:  None.     ACCESSION NUMBER(S):  [id number]     ORDERING CLINICIAN:  NICOLE COOPER     TECHNIQUE:  Helical data acquisition of the chest was obtained without IV  contrast material.  Images were reformatted in axial, coronal, and  sagittal planes.     FINDINGS:  LUNGS AND AIRWAYS:  The trachea and central airways are patent. No endobronchial lesion  is seen.The previously identified right tracheal thickening as  resolved. There is linear filling defects within the trachea  consistent with adherent mucus.     There is a stable right upper lobe emphysematous bullous/bleb.There  is no focal consolidation, pleural effusion, or pneumothorax.     There is mild peribronchial thickening.  There are no suspicious parenchymal lung nodules.     MEDIASTINUM AND YARED, LOWER NECK AND AXILLA:  The visualized thyroid gland is within normal limits.  Scattered mediastinal lymph nodes are felt to be reactive in nature.  Esophagus appears within normal limits as seen.     HEART AND VESSELS:  The thoracic aorta normal in course and caliber.  There is mild prominence of the central pulmonary arteries.  There are mild-to-moderate calcifications of the coronary arteries  The cardiac chambers are not enlarged.  There is no pericardial effusion seen.     UPPER ABDOMEN:  Low-attenuation adrenal adenoma again noted.           CHEST WALL AND OSSEOUS STRUCTURES:  Chest wall is within normal limits.  No acute osseous pathology.There are no suspicious osseous lesions.       Impression    1. There are no suspicious parenchymal lung nodules.  2. Resolution of the previously described right lateral tracheal  thickening  probably represents resolving adherent mucus recommend  return to screening with 1 year low-dose chest CT for surveillance.        LUNG RADS CATEGORY:  Lung-RADS 2 (Benign Appearance or Indolent behavior): Continue with  annual screening in 12 months,  CT Chest Lung Ca Screen Initial  or Follow up LR1/LR2/Continuation of Screening Low Dose recommended  as per American College of Radiology Guidelines Lung-RADS Version  2022.          3:50 PM  Martínez Martinez MD  [place] [id number] Clinical Support Staff  Good result  Some things have resolved and there are no new spots noted  Recheck another CT scan in 1 year    Results were successfully communicated with the patient and they acknowledged their understanding.

## 2023-10-19 ENCOUNTER — OFFICE VISIT (OUTPATIENT)
Dept: ORTHOPEDIC SURGERY | Facility: CLINIC | Age: 58
End: 2023-10-19
Payer: COMMERCIAL

## 2023-10-19 DIAGNOSIS — G89.29 CHRONIC PAIN OF LEFT KNEE: ICD-10-CM

## 2023-10-19 DIAGNOSIS — M25.561 CHRONIC PAIN OF RIGHT KNEE: Primary | ICD-10-CM

## 2023-10-19 DIAGNOSIS — G89.29 CHRONIC PAIN OF RIGHT KNEE: Primary | ICD-10-CM

## 2023-10-19 DIAGNOSIS — M25.562 CHRONIC PAIN OF LEFT KNEE: ICD-10-CM

## 2023-10-19 PROCEDURE — 3008F BODY MASS INDEX DOCD: CPT | Performed by: ORTHOPAEDIC SURGERY

## 2023-10-19 PROCEDURE — 20610 DRAIN/INJ JOINT/BURSA W/O US: CPT | Performed by: ORTHOPAEDIC SURGERY

## 2023-10-19 PROCEDURE — 99214 OFFICE O/P EST MOD 30 MIN: CPT | Performed by: ORTHOPAEDIC SURGERY

## 2023-10-19 NOTE — PROGRESS NOTES
This is a consultation from Dr. Yin Hamm MD for   Chief Complaint   Patient presents with    Left Knee - Pain       This is a 58 y.o. female who presents for follow-up for her bilateral knee pain.  Patient has bilateral knee arthritis, she had cortisone injections about 6 months ago.  He gave her excellent relief.  Since then she had return of her symptoms on last few weeks, exacerbation of her pain.  Left knee is worse than right.  Sharp stabbing pain over the medial knee, gets worse if she walks long distances or stands for long time.  No numbness or tingling no fevers or chills no other issues    Physical Exam    There has been no interval change in this patient's past medical, surgical, medications, allergies, family history or social history since the most recent visit to a provider within our department. 14 point review of systems was performed, reviewed, and negative except for pertinent positives documented in the history of present illness.     Constitutional: well developed, well nourished female in no acute distress  Psychiatric: normal mood, appropriate affect  Eyes: sclera anicteric  HENT: normocephalic/atraumatic  CV: regular rate and rhythm   Respiratory: non labored breathing  Integumentary: no rash  Neurological: moves all extremities    Bilateral knee exam: skin intact no lacerations or abrations.  1+ effusion.  Tender medial joint line. negative log roll negative patellar grind. ROM 0-120. stable to varus and valgus stress at 0 and 30 degrees. negative lachman negative posterior drawer negative nidhi. 5/5 ehl/fhl/gs/ta. silt s/s/sp/dp/t. 2+ dp/pt        Xrays were ordered by me, they were reviewed and independently interpreted by me today, they show severe degenerative disease left knee more moderate disease right knee    Procedure Note:    Diagnosis: bilateral knee arthritis  Procedure: bilateral knee injection    Verbal consent was obtained from the patient, risks benefits and  alternatives were discussed. We discussed the risks and benefits and potential morbidity related to the treatment, and to the prescription medication administered in the injectionA timeout was performed, and the correct patient and site of injection were identified and verified. The lateral side of the knee was sterilized with Betadine, and anesthetized with ethyl chloride spray. The bilateral knee was injected with 1ml kenalog and 4ml lidocaine in the usual fashion. A sterile Band-Aid was placed. The patient tolerated the procedure well with no immediate complications. Standard post injection precautions and instructions were given.        Procedures      Impression/Plan: This is a 58 y.o. female with bilateral knee arthritis.  I had an in depth discussion with the patient regarding treatment options for arthritis and their relative risks and benefits. We reviewed surgical and nonsurgical option for treatment. Treatments include anti inflammatory medications, physical therapy, weight loss, activity modification, use of assistive devices, injection therapies. We discussed current prescriptions and risks and benefits of continuation of prescription medication as apporpriate. We discussed that arthritis is often progressive over time, an in end stage arthritis surgical interventions can be considered, including arthroplasty. All questions were answered and the patient voiced their understanding.  I will see her back as needed    BMI Readings from Last 1 Encounters:   07/14/23 45.19 kg/m²      Lab Results   Component Value Date    CREATININE 0.75 03/30/2023     Tobacco Use: High Risk (10/19/2023)    Patient History     Smoking Tobacco Use: Every Day     Smokeless Tobacco Use: Never     Passive Exposure: Not on file      Computed MELD 3.0 unavailable. Necessary lab results were not found in the last year.  Computed MELD-Na unavailable. Necessary lab results were not found in the last year.       Lab Results   Component  "Value Date    [facility] 6.1 06/15/2023     No results found for: \"STAPHMRSASCR\"  "

## 2023-10-31 ENCOUNTER — TELEMEDICINE (OUTPATIENT)
Dept: PRIMARY CARE | Facility: CLINIC | Age: 58
End: 2023-10-31
Payer: COMMERCIAL

## 2023-10-31 DIAGNOSIS — U07.1 COVID-19: Primary | ICD-10-CM

## 2023-10-31 PROCEDURE — 99213 OFFICE O/P EST LOW 20 MIN: CPT | Performed by: FAMILY MEDICINE

## 2023-10-31 ASSESSMENT — ENCOUNTER SYMPTOMS
FEVER: 0
SHORTNESS OF BREATH: 0
FATIGUE: 1
WHEEZING: 0

## 2023-10-31 NOTE — PATIENT INSTRUCTIONS
discussed the symptoms of  COVID with the patient  :    Symptoms include: Fever, shortness of breath , cough, diarrhea, loss of sense of smell, muscle aches  Generally treatment is symptomatic: Tylenol, push fluids, rest  Call the office or seek treatment with worsening symptoms.  If has shortness of breath worsens recommend going to ER  Older folks and people at higher risk may benefit from antiviral medication  The current recommendation is to quarantine for 5 days and then if you are fever free you can come out of quarantine but you need to continue to mask for 5 days  Similar recommendations for close household contacts     Discussed pros cons of paxlovid with pt

## 2023-10-31 NOTE — PROGRESS NOTES
Subjective   Patient ID: Rashida Whitfield is a 58 y.o. female who presents for covid positive. Sx's onset last Friday the day after having her covid and flu vaccines. Sx's began with fatigue, then pt developed cold sx's. Thought she was experiencing allergies. Home test positive; was at a  prior to the onset of sx's and was informed that three people were covid positive. Taking OTC Mucinex and generic Zyrtec.     Sick over the last 4 days   Deep breathing more     Minimal cough          Review of Systems   Constitutional:  Positive for fatigue. Negative for fever.   HENT:  Positive for congestion.    Respiratory:  Negative for shortness of breath and wheezing.        Objective   There were no vitals taken for this visit.    Physical Exam    Pt alert and oriented nontoxic   No tachypnea     Assessment/Plan   Problem List Items Addressed This Visit    None  Visit Diagnoses         Codes    COVID-19    -  Primary U07.1               
145

## 2023-12-11 RX ORDER — TRIAMCINOLONE ACETONIDE 40 MG/ML
40 INJECTION, SUSPENSION INTRA-ARTICULAR; INTRAMUSCULAR ONCE
Status: COMPLETED | OUTPATIENT
Start: 2023-12-11 | End: 2023-12-11

## 2023-12-11 RX ADMIN — TRIAMCINOLONE ACETONIDE 40 MG: 40 INJECTION, SUSPENSION INTRA-ARTICULAR; INTRAMUSCULAR at 17:47

## 2024-02-20 ENCOUNTER — APPOINTMENT (OUTPATIENT)
Dept: ORTHOPEDIC SURGERY | Facility: CLINIC | Age: 59
End: 2024-02-20
Payer: COMMERCIAL

## 2024-03-02 DIAGNOSIS — E78.2 HYPERLIPEMIA, MIXED: ICD-10-CM

## 2024-03-04 DIAGNOSIS — E78.2 HYPERLIPEMIA, MIXED: ICD-10-CM

## 2024-03-04 RX ORDER — ATORVASTATIN CALCIUM 10 MG/1
10 TABLET, FILM COATED ORAL DAILY
Qty: 90 TABLET | Refills: 0 | Status: SHIPPED | OUTPATIENT
Start: 2024-03-04 | End: 2024-06-04

## 2024-03-04 RX ORDER — ATORVASTATIN CALCIUM 10 MG/1
10 TABLET, FILM COATED ORAL DAILY
Qty: 90 TABLET | Refills: 0 | OUTPATIENT
Start: 2024-03-04

## 2024-03-19 ENCOUNTER — OFFICE VISIT (OUTPATIENT)
Dept: ORTHOPEDIC SURGERY | Facility: CLINIC | Age: 59
End: 2024-03-19
Payer: COMMERCIAL

## 2024-03-19 ENCOUNTER — HOSPITAL ENCOUNTER (OUTPATIENT)
Dept: RADIOLOGY | Facility: HOSPITAL | Age: 59
Discharge: HOME | End: 2024-03-19
Payer: COMMERCIAL

## 2024-03-19 VITALS — HEIGHT: 67 IN | WEIGHT: 270 LBS | BODY MASS INDEX: 42.38 KG/M2

## 2024-03-19 DIAGNOSIS — M25.562 CHRONIC PAIN OF LEFT KNEE: Primary | ICD-10-CM

## 2024-03-19 DIAGNOSIS — G89.29 CHRONIC PAIN OF RIGHT KNEE: ICD-10-CM

## 2024-03-19 DIAGNOSIS — G89.29 CHRONIC PAIN OF LEFT KNEE: Primary | ICD-10-CM

## 2024-03-19 DIAGNOSIS — M25.562 CHRONIC PAIN OF LEFT KNEE: ICD-10-CM

## 2024-03-19 DIAGNOSIS — M25.561 CHRONIC PAIN OF RIGHT KNEE: ICD-10-CM

## 2024-03-19 DIAGNOSIS — G89.29 CHRONIC PAIN OF LEFT KNEE: ICD-10-CM

## 2024-03-19 PROCEDURE — 73562 X-RAY EXAM OF KNEE 3: CPT | Mod: RIGHT SIDE | Performed by: RADIOLOGY

## 2024-03-19 PROCEDURE — 20610 DRAIN/INJ JOINT/BURSA W/O US: CPT | Performed by: ORTHOPAEDIC SURGERY

## 2024-03-19 PROCEDURE — 73560 X-RAY EXAM OF KNEE 1 OR 2: CPT | Mod: LT

## 2024-03-19 PROCEDURE — 73560 X-RAY EXAM OF KNEE 1 OR 2: CPT | Mod: RIGHT SIDE | Performed by: RADIOLOGY

## 2024-03-19 PROCEDURE — 73562 X-RAY EXAM OF KNEE 3: CPT | Mod: RT

## 2024-03-19 PROCEDURE — 3008F BODY MASS INDEX DOCD: CPT | Performed by: ORTHOPAEDIC SURGERY

## 2024-03-19 PROCEDURE — 99214 OFFICE O/P EST MOD 30 MIN: CPT | Performed by: ORTHOPAEDIC SURGERY

## 2024-03-19 RX ORDER — TRIAMCINOLONE ACETONIDE 40 MG/ML
1 INJECTION, SUSPENSION INTRA-ARTICULAR; INTRAMUSCULAR
Status: COMPLETED | OUTPATIENT
Start: 2024-03-19 | End: 2024-03-19

## 2024-03-19 RX ADMIN — TRIAMCINOLONE ACETONIDE 1 ML: 40 INJECTION, SUSPENSION INTRA-ARTICULAR; INTRAMUSCULAR at 09:45

## 2024-03-19 NOTE — PROGRESS NOTES
This is a consultation from Dr. Yin Hamm MD for   Chief Complaint   Patient presents with    Left Knee - Pain       This is a 58 y.o. female who presents for follow-up for her bilateral knees.  Patient has bilateral knee arthritis, she cortisone injections about 5 months ago.  They gave her good relief.  Since then she has had return of her symptoms in the past 1 to 2 weeks.  She exacerbation of her pain, sharp pain mainly over the medial knee worse on the left than the right.  No numbness or tingling.  She is still able to get out and do most of her activities, she walks her dog several miles each day.    Physical Exam    There has been no interval change in this patient's past medical, surgical, medications, allergies, family history or social history since the most recent visit to a provider within our department. 14 point review of systems was performed, reviewed, and negative except for pertinent positives documented in the history of present illness.     Constitutional: well developed, well nourished female in no acute distress  Psychiatric: normal mood, appropriate affect  Eyes: sclera anicteric  HENT: normocephalic/atraumatic  CV: regular rate and rhythm   Respiratory: non labored breathing  Integumentary: no rash  Neurological: moves all extremities    Bilateral knee exam: skin intact no lacerations or abrations.  1+ effusion.  Tender medial joint line. negative log roll negative patellar grind. ROM 0-120. stable to varus and valgus stress at 0 and 30 degrees. negative lachman negative posterior drawer negative nidhi. 5/5 ehl/fhl/gs/ta. silt s/s/sp/dp/t. 2+ dp/pt        Xrays were ordered by me, they were reviewed and independently interpreted by me today, they show severe degenerative disease in the left, moderate disease of the right    L Inj/Asp: bilateral knee on 3/19/2024 9:45 AM  Indications: pain and joint swelling  Details: 22 G needle, anterolateral approach  Medications (Right): 1 mL  triamcinolone acetonide 40 mg/mL  Medications (Left): 1 mL triamcinolone acetonide 40 mg/mL    Discussion:  I discussed the conservative treatment options for knee osteoarthritis including but not limited to physical therapy, oral NSAIDS, activity and lifestyle modification, and corticosteroid injections. Pt has elected to undergo a cortisone injection today. I have explained the risk and benefits of an injection including the possibility of joint infection, bleeding, damage to cartilage, allergic reaction. Patient verbalized understanding and gave verbal consent wishes to proceed with a intra-articular cortisone injection for their knee.    Procedure:  After discussing the risk and benefits of the procedure, we proceeded with an intra-articular bilateral knee injection. We discussed the risks and benefits and potential morbidity related to the treatment, and to the prescription medication administered in the injection    With the patient's informed verbal consent, the bilateral knees were prepped in standard sterile fashion with Chlorhexidine. The skin was then anesthetized with ethyl chloride spray and cleaned again with Chlorhexidine. The bilateral knees were then apirated/injected with a prefilled 20-gauge syringe of 40 mg Kenalog + 4 ml Lidocaine using the lateral approach without complications.  The patient tolerated this well and felt immediate initial relief of symptoms. A bandaid was applied and the patient ambulated out of the clinic on ther own accord without difficulty. Patient was instructed to avoid physical activity for 24-48 hours to prevent the knees from swelling and may ice the knees as tolerated. Patient should contact the office if any signs of of infection appear: redness, fever, chills, drainage, swelling or warmth to the knees.  Pt understands that the injections can be repeated no sooner than 3 months.      Procedure, treatment alternatives, risks and benefits explained, specific risks  "discussed. Consent was given by the patient. Immediately prior to procedure a time out was called to verify the correct patient, procedure, equipment, support staff and site/side marked as required. Patient was prepped and draped in the usual sterile fashion.             Impression/Plan: This is a 58 y.o. female with bilateral knee arthritis.  I had an in depth discussion with the patient regarding treatment options for arthritis and their relative risks and benefits. We reviewed surgical and nonsurgical option for treatment. Treatments include anti inflammatory medications, physical therapy, weight loss, activity modification, use of assistive devices, injection therapies. We discussed current prescriptions and risks and benefits of continuation of prescription medication as apporpriate. We discussed that arthritis is often progressive over time, an in end stage arthritis surgical interventions can be considered, including arthroplasty. All questions were answered and the patient voiced their understanding.  Doing well with nonsurgical treatment I will see her back as needed    BMI Readings from Last 1 Encounters:   03/19/24 42.29 kg/m²      Lab Results   Component Value Date    CREATININE 0.75 03/30/2023     Tobacco Use: High Risk (3/19/2024)    Patient History     Smoking Tobacco Use: Every Day     Smokeless Tobacco Use: Never     Passive Exposure: Not on file      Computed MELD 3.0 unavailable. Necessary lab results were not found in the last year.  Computed MELD-Na unavailable. Necessary lab results were not found in the last year.       Lab Results   Component Value Date    [facility] 6.1 06/15/2023     No results found for: \"STAPHMRSASCR\"  "

## 2024-03-26 ENCOUNTER — OFFICE VISIT (OUTPATIENT)
Dept: PRIMARY CARE | Facility: CLINIC | Age: 59
End: 2024-03-26
Payer: COMMERCIAL

## 2024-03-26 VITALS
WEIGHT: 283 LBS | HEIGHT: 67 IN | BODY MASS INDEX: 44.42 KG/M2 | DIASTOLIC BLOOD PRESSURE: 76 MMHG | SYSTOLIC BLOOD PRESSURE: 132 MMHG | HEART RATE: 69 BPM | TEMPERATURE: 98.5 F | OXYGEN SATURATION: 93 %

## 2024-03-26 DIAGNOSIS — E78.2 HYPERLIPEMIA, MIXED: ICD-10-CM

## 2024-03-26 DIAGNOSIS — R87.610 ASCUS OF CERVIX WITH NEGATIVE HIGH RISK HPV: ICD-10-CM

## 2024-03-26 DIAGNOSIS — Z00.00 ROUTINE GENERAL MEDICAL EXAMINATION AT A HEALTH CARE FACILITY: Primary | ICD-10-CM

## 2024-03-26 DIAGNOSIS — Z12.31 ENCOUNTER FOR SCREENING MAMMOGRAM FOR MALIGNANT NEOPLASM OF BREAST: ICD-10-CM

## 2024-03-26 DIAGNOSIS — R53.83 FATIGUE, UNSPECIFIED TYPE: ICD-10-CM

## 2024-03-26 DIAGNOSIS — E66.01 MORBID OBESITY WITH BMI OF 40.0-44.9, ADULT (MULTI): ICD-10-CM

## 2024-03-26 DIAGNOSIS — R73.9 HYPERGLYCEMIA: ICD-10-CM

## 2024-03-26 DIAGNOSIS — I25.10 ATHEROSCLEROSIS OF NATIVE CORONARY ARTERY OF NATIVE HEART WITHOUT ANGINA PECTORIS: ICD-10-CM

## 2024-03-26 DIAGNOSIS — G47.33 OBSTRUCTIVE SLEEP APNEA: ICD-10-CM

## 2024-03-26 PROCEDURE — 99396 PREV VISIT EST AGE 40-64: CPT | Performed by: FAMILY MEDICINE

## 2024-03-26 PROCEDURE — 4004F PT TOBACCO SCREEN RCVD TLK: CPT | Performed by: FAMILY MEDICINE

## 2024-03-26 PROCEDURE — 3008F BODY MASS INDEX DOCD: CPT | Performed by: FAMILY MEDICINE

## 2024-03-26 PROCEDURE — 87624 HPV HI-RISK TYP POOLED RSLT: CPT

## 2024-03-26 PROCEDURE — 88175 CYTOPATH C/V AUTO FLUID REDO: CPT

## 2024-03-26 ASSESSMENT — PATIENT HEALTH QUESTIONNAIRE - PHQ9
2. FEELING DOWN, DEPRESSED OR HOPELESS: NOT AT ALL
SUM OF ALL RESPONSES TO PHQ9 QUESTIONS 1 AND 2: 0
1. LITTLE INTEREST OR PLEASURE IN DOING THINGS: NOT AT ALL

## 2024-03-26 ASSESSMENT — ENCOUNTER SYMPTOMS: SHORTNESS OF BREATH: 0

## 2024-03-26 NOTE — PROGRESS NOTES
Subjective   Pt is here for a CPE.     Hyperlipidemia   Coronary artery disease  on lipitor    Some anxiety    Does counseling in past   Meds hasn't helped   Trouble sleeping at night   Eating at night , lack of motivation     Exercising   No CHEST PAIN or SHORTNESS OF BREATH     Knee shots by orthopedics     Working on cpap trying to get back on it ,  trouble with it      Smoking 1/2 ppd still   No CP or sob     Obstructive sleep apnea    Was on ozempic and insurance stopped paying for it     UTD with colon      ROS :  ( No or Yes )  Any eye problems:    Y  Frequent nasal congestion or sneezing:  Y  Difficulty hearing:  N  Ear problems:   N  Asthma or wheezing:   N  Frequent cough:   N  Shortness of breath:N  Hemoptysis: N  Hx of TB: N  High blood pressure: N  Heart disease: N  Heart murmur:Y  Chest pain or pressure with exertion:N  Leg pains with walking up hill: N  Fast heartbeat or palpitations:N  Varicose veins: N  Difficulty swallowing foods or liquids: N  Abdominal pains: N  Frequent indigestion or heartburn: Y  Constipation: N  Diarrhea or loose stools: N  Weight changes recently: N  Change in bowel movements: N  An ulcer: N  Black stools: N  Jaundice, hepatitis or liver problems: N  Gallstones or gallbladder problems: N  Stomach or intestinal problems: N  Vomited blood : N  Blood in bowel movements: N  Sickle cell trait  or Anemia: N  Been refused as a blood donor: N  Problems with her kidney, bladder, or prostate: N  Loss of control of your urine: y  Pain or burning with urination: N  Blood in her urine: N  Trouble starting flow of urine: N  Frequent urination at night: Y  History of venereal disease: N  Any skin problems: N  Diabetes: N  Thyroid disease: N  Frequent back pain: N  Pain or swelling around joints: Y  Broken any bones: Y  Frequent headaches: N  Dizziness: N  Have you ever had Seizures or convulsions: N  Have you ever temporarily lost control of your hand or foot : N   Had a stroke or been  "paralyzed : N  Temporarily lost your ability to speak: N  Fainted or lost consciousness: N  Hallucinations: N  Nervousness: N  Do you take medications for your nerves: N  Trouble falling asleep or staying asleep: y  Do you feel tired even after a good night sleep: y  Do you feel down in the dumps or depressed: N  Frequent crying: N  Using alcohol excessively: N  Any street drug use : N  Do have any other medical problems that are concerns :      Review of Systems   Respiratory:  Negative for shortness of breath.    Cardiovascular:  Negative for chest pain.       Objective   /76   Pulse 69   Temp 36.9 °C (98.5 °F)   Ht 1.702 m (5' 7\")   Wt 128 kg (283 lb)   SpO2 93%   BMI 44.32 kg/m²     Physical Exam  Constitutional:       General: She is not in acute distress.     Appearance: Normal appearance.   HENT:      Head: Normocephalic and atraumatic.      Right Ear: Tympanic membrane, ear canal and external ear normal.      Left Ear: Tympanic membrane, ear canal and external ear normal.      Nose: Nose normal. No congestion or rhinorrhea.      Mouth/Throat:      Mouth: Mucous membranes are moist.      Pharynx: No oropharyngeal exudate or posterior oropharyngeal erythema.   Eyes:      Extraocular Movements: Extraocular movements intact.      Conjunctiva/sclera: Conjunctivae normal.      Pupils: Pupils are equal, round, and reactive to light.   Neck:      Vascular: No carotid bruit.   Cardiovascular:      Rate and Rhythm: Normal rate and regular rhythm.      Heart sounds: No murmur heard.  Pulmonary:      Effort: Pulmonary effort is normal.      Breath sounds: Normal breath sounds. No wheezing or rhonchi.   Chest:   Breasts:     Right: Normal.      Left: Normal.   Abdominal:      General: Bowel sounds are normal.      Palpations: Abdomen is soft.   Genitourinary:     General: Normal vulva.      Vagina: Normal.      Cervix: Normal.      Uterus: Normal.       Adnexa: Right adnexa normal and left adnexa normal. "   Musculoskeletal:         General: No swelling.      Cervical back: No rigidity.      Right lower leg: No edema.      Left lower leg: No edema.   Lymphadenopathy:      Cervical: No cervical adenopathy.      Upper Body:      Right upper body: No axillary adenopathy.      Left upper body: No axillary adenopathy.   Skin:     General: Skin is warm and dry.      Findings: No rash.   Neurological:      General: No focal deficit present.      Mental Status: She is alert and oriented to person, place, and time.      Cranial Nerves: No cranial nerve deficit.      Gait: Gait normal.      Deep Tendon Reflexes: Reflexes normal.   Psychiatric:         Mood and Affect: Mood normal.         Behavior: Behavior normal.         Assessment/Plan   Problem List Items Addressed This Visit             ICD-10-CM    Coronary atherosclerosis I25.10    Relevant Orders    CBC    Comprehensive Metabolic Panel    Lipid Panel    Thyroid Stimulating Hormone    Hemoglobin A1C    Vitamin B12    Vitamin D 25-Hydroxy,Total (for eval of Vitamin D levels)    Hyperlipemia, mixed E78.2    Relevant Orders    CBC    Comprehensive Metabolic Panel    Lipid Panel    Thyroid Stimulating Hormone    Hemoglobin A1C    Vitamin B12    Vitamin D 25-Hydroxy,Total (for eval of Vitamin D levels)    Obstructive sleep apnea G47.33    Relevant Orders    CBC    Comprehensive Metabolic Panel    Lipid Panel    Thyroid Stimulating Hormone    Hemoglobin A1C    Vitamin B12    Vitamin D 25-Hydroxy,Total (for eval of Vitamin D levels)    Morbid obesity with BMI of 40.0-44.9, adult (CMS/[facility]) E66.01, Z68.41    Relevant Orders    CBC    Comprehensive Metabolic Panel    Lipid Panel    Thyroid Stimulating Hormone    Hemoglobin A1C    Vitamin B12    Vitamin D 25-Hydroxy,Total (for eval of Vitamin D levels)     Other Visit Diagnoses         Codes    Routine general medical examination at a health care facility    -  Primary Z00.00    Relevant Orders    CBC    Comprehensive Metabolic  Panel    Lipid Panel    Thyroid Stimulating Hormone    Hemoglobin A1C    Vitamin B12    Vitamin D 25-Hydroxy,Total (for eval of Vitamin D levels)    Hyperglycemia     R73.9    Relevant Orders    CBC    Comprehensive Metabolic Panel    Lipid Panel    Thyroid Stimulating Hormone    Hemoglobin A1C    Vitamin B12    Vitamin D 25-Hydroxy,Total (for eval of Vitamin D levels)    ASCUS of cervix with negative high risk HPV     R87.610    Relevant Orders    THINPREP PAP TEST    CBC    Comprehensive Metabolic Panel    Lipid Panel    Thyroid Stimulating Hormone    Hemoglobin A1C    Vitamin B12    Vitamin D 25-Hydroxy,Total (for eval of Vitamin D levels)    Encounter for screening mammogram for malignant neoplasm of breast     Z12.31    Relevant Orders    BI mammo bilateral screening tomosynthesis    CBC    Comprehensive Metabolic Panel    Lipid Panel    Thyroid Stimulating Hormone    Hemoglobin A1C    Vitamin B12    Vitamin D 25-Hydroxy,Total (for eval of Vitamin D levels)    Fatigue, unspecified type     R53.83    Relevant Orders    Vitamin B12    Vitamin D 25-Hydroxy,Total (for eval of Vitamin D levels)

## 2024-03-26 NOTE — PATIENT INSTRUCTIONS
Get your blood work as ordered.  You should hear from our office with results whether they are normal are not within a few days.  Please call the office if you do not hear from us.     After you get testing done you will get notified from our office with regards to your results whether they are normal or not.  If you are registered in the electronic health record we will send you information in that system.  If you have any questions or need clarification please feel free to call the office.     I discussed anxiety with the patient: She is not really interested in medications, not really interested in therapy      You should be getting cardiovascular exercise 3-5 times per week for 30-45 minutes.  This includes exercises such as running, brisk walking, biking or swimming.       It is important to actively try to reduce your weight to become healthier.  There are several things you can do to try and lose weight.  You should be getting 30-45 minutes of cardiovascular exercise 3-5 times per week, activities such as running and jogging treadmill swimming and brisk walking are helpful.  You will also need to watch your portion control, decrease calorie intake overall.  Following a low carbohydrate diet is the most successful way to lose weight and take it off long-term.  In order to achieve weight loss it is important that you track exactly what your calorie intake is during the day.  I usually recommend doing some sort of formal program or Belia. there are lot of good Apps out there to help you follow your calorie intake such as weight watchers, Noom, Fitbit.  It is recommended that you reduce the intake of sweets, sugar, reduce carbohydrate intake.  Healthy diets with more whole grains, vegetables, fruits, nuts and seeds with plant oils are healthier diets than animal-based fats.  Reduce your intake of white bread, grains, potatoes, processed foods.     Quit Smoking   It is very important that you quit smoking.  Even if  you have smoked for a long time there are still many physical benefits to stopping smoking.  There are also numerous conditions that are worsened by smoking such as cancer, lung disease, emphysema, and heart disease to name a few.  Pick a date to quit.  On your quit date, get rid of everything that has to do with smoking.   If you were given a medication to quit smoking, such as Chantix or Wellbutrin it is recommended to start the week prior to quitting.  If you are using nicotine replacement to quit smoking, it is very important you do not smoke while you have a patch on or if using nicotine gum.  That can give you a toxic amount of nicotine in your system and can be dangerous to your heart.  Avoid social settings where you may be around smokers.  Get your family and friends' support.  Call the [place] Tobacco Quit Line at 1-626.434.7839 for help and guidance.  You can quit smoking if you really want to.      If you have a BMI  (height per weight ratio)  that is > 30 or > 27  with risk factors such as diabetes, heart disease, high blood pressure or hypertension you may qualify for more intensive weight loss options.   I can give you a referral for to aggressively work on weight loss through a dietitian, possible medications and possible surgical interventions.  There are also a few options for weight loss medications.  There is an over-the-counter medication called orlistat ( Jah ) this works to prevent fat absorption, it can cause some diarrhea.  There are a couple of prescription medications that sometimes can help with weight loss: Bupropion, topiramate, Contrave  The newest class of medications that are indicated for weight loss are the GLP2 medications  : Mounjaro, Saxenda , Wegovy and Zepbound , these medications are injectables.  In order to proceed with getting a prescription for one of these medications it is your responsibility to check with your insurance to see what the cost of these medications would be  for you and let me know if you would like to proceed with these medications.  If we do start these medications you will need to follow-up in a month for weight checks.  These medications  get you started on weight loss but you will need to continue with behavioral changes, dietary management and exercise to continue to keep the weight off.    If you are interested in trying 1 of these medications you need to check with your insurance formulary to see if they are covered.  If your insurance does not specifically cover the drug name Mounjaro, Saxenda,Wegovy or Zepbound WITHOUT a prior authorization then it is not covered for weight loss.    Ozempic,Semaglutide, Rybelsus, Trulicity are diabetic medications, not weight loss medications.  If your insurance states they need a prior authorization then they do not cover it for weight loss . We will not do prior authorizations for weight loss.  [employer] has a savings program online for  Zepbound on their website which is about half the cost of paying cash for these medications.  There are other options that may be more cost effective if your insurance company does not cover the medication.  There are some online pharmacies that are providing medications as well as some local weight loss clinics that provide the GLP-1 medications that may be cheaper out-of-pocket.    There is significant safety concerns about compounded injectable GLP-1 medications.  The ingredients may be coming from unknown sources so I would not recommend these.  There are some pharmacies that offer compounded sublingual semaglutide.  If you can find this medication supplied by a [facility] approved pharmacy that is using the FDA approved semaglutide this is a safer bet.    If you do start 1 of these medications you will need to see me every 2 to 3 months for weight checks and reevaluations.

## 2024-04-04 ENCOUNTER — LAB (OUTPATIENT)
Dept: LAB | Facility: LAB | Age: 59
End: 2024-04-04
Payer: COMMERCIAL

## 2024-04-04 DIAGNOSIS — E78.2 HYPERLIPEMIA, MIXED: ICD-10-CM

## 2024-04-04 DIAGNOSIS — Z12.31 ENCOUNTER FOR SCREENING MAMMOGRAM FOR MALIGNANT NEOPLASM OF BREAST: ICD-10-CM

## 2024-04-04 DIAGNOSIS — E66.01 MORBID OBESITY WITH BMI OF 40.0-44.9, ADULT (MULTI): ICD-10-CM

## 2024-04-04 DIAGNOSIS — R53.83 FATIGUE, UNSPECIFIED TYPE: ICD-10-CM

## 2024-04-04 DIAGNOSIS — I25.10 ATHEROSCLEROSIS OF NATIVE CORONARY ARTERY OF NATIVE HEART WITHOUT ANGINA PECTORIS: ICD-10-CM

## 2024-04-04 DIAGNOSIS — G47.33 OBSTRUCTIVE SLEEP APNEA: ICD-10-CM

## 2024-04-04 DIAGNOSIS — R73.9 HYPERGLYCEMIA: ICD-10-CM

## 2024-04-04 DIAGNOSIS — Z00.00 ROUTINE GENERAL MEDICAL EXAMINATION AT A HEALTH CARE FACILITY: ICD-10-CM

## 2024-04-04 DIAGNOSIS — R87.610 ASCUS OF CERVIX WITH NEGATIVE HIGH RISK HPV: ICD-10-CM

## 2024-04-04 LAB
25(OH)D3 SERPL-MCNC: 17 NG/ML (ref 30–100)
ALBUMIN SERPL BCP-MCNC: 3.9 G/DL (ref 3.4–5)
ALP SERPL-CCNC: 81 U/L (ref 33–110)
ALT SERPL W P-5'-P-CCNC: 10 U/L (ref 7–45)
ANION GAP SERPL CALC-SCNC: 11 MMOL/L (ref 10–20)
AST SERPL W P-5'-P-CCNC: 12 U/L (ref 9–39)
BILIRUB SERPL-MCNC: 0.4 MG/DL (ref 0–1.2)
BUN SERPL-MCNC: 15 MG/DL (ref 6–23)
CALCIUM SERPL-MCNC: 9.1 MG/DL (ref 8.6–10.3)
CHLORIDE SERPL-SCNC: 105 MMOL/L (ref 98–107)
CHOLEST SERPL-MCNC: 180 MG/DL (ref 0–199)
CHOLESTEROL/HDL RATIO: 3.2
CO2 SERPL-SCNC: 30 MMOL/L (ref 21–32)
CREAT SERPL-MCNC: 0.77 MG/DL (ref 0.5–1.05)
EGFRCR SERPLBLD CKD-EPI 2021: 90 ML/MIN/1.73M*2
ERYTHROCYTE [DISTWIDTH] IN BLOOD BY AUTOMATED COUNT: 14.1 % (ref 11.5–14.5)
EST. AVERAGE GLUCOSE BLD GHB EST-MCNC: 126 MG/DL
GLUCOSE SERPL-MCNC: 98 MG/DL (ref 74–99)
HBA1C MFR BLD: 6 %
HCT VFR BLD AUTO: 44.4 % (ref 36–46)
HDLC SERPL-MCNC: 55.4 MG/DL
HGB BLD-MCNC: 14.1 G/DL (ref 12–16)
LDLC SERPL CALC-MCNC: 109 MG/DL
MCH RBC QN AUTO: 30.2 PG (ref 26–34)
MCHC RBC AUTO-ENTMCNC: 31.8 G/DL (ref 32–36)
MCV RBC AUTO: 95 FL (ref 80–100)
NON HDL CHOLESTEROL: 125 MG/DL (ref 0–149)
NRBC BLD-RTO: 0 /100 WBCS (ref 0–0)
PLATELET # BLD AUTO: 227 X10*3/UL (ref 150–450)
POTASSIUM SERPL-SCNC: 4.6 MMOL/L (ref 3.5–5.3)
PROT SERPL-MCNC: 6.1 G/DL (ref 6.4–8.2)
RBC # BLD AUTO: 4.67 X10*6/UL (ref 4–5.2)
SODIUM SERPL-SCNC: 141 MMOL/L (ref 136–145)
TRIGL SERPL-MCNC: 78 MG/DL (ref 0–149)
TSH SERPL-ACNC: 2.41 MIU/L (ref 0.44–3.98)
VIT B12 SERPL-MCNC: 376 PG/ML (ref 211–911)
VLDL: 16 MG/DL (ref 0–40)
WBC # BLD AUTO: 8.4 X10*3/UL (ref 4.4–11.3)

## 2024-04-04 PROCEDURE — 36415 COLL VENOUS BLD VENIPUNCTURE: CPT

## 2024-04-04 PROCEDURE — 82607 VITAMIN B-12: CPT

## 2024-04-04 PROCEDURE — 83036 HEMOGLOBIN GLYCOSYLATED A1C: CPT

## 2024-04-04 PROCEDURE — 85027 COMPLETE CBC AUTOMATED: CPT

## 2024-04-04 PROCEDURE — 82306 VITAMIN D 25 HYDROXY: CPT

## 2024-04-04 PROCEDURE — 80061 LIPID PANEL: CPT

## 2024-04-04 PROCEDURE — 80053 COMPREHEN METABOLIC PANEL: CPT

## 2024-04-04 PROCEDURE — 84443 ASSAY THYROID STIM HORMONE: CPT

## 2024-04-27 NOTE — PATIENT INSTRUCTIONS
You should be getting cardiovascular exercise 3-5 times per week for 30-45 minutes.  This includes exercises such as running, brisk walking, biking or swimming.       It is important to actively try to reduce your weight to become healthier.  There are several things you can do to try and lose weight.  You should be getting 30-45 minutes of cardiovascular exercise 3-5 times per week, activities such as running and jogging treadmill swimming and brisk walking are helpful.  You will also need to watch your portion control, decrease calorie intake overall.  Following a low carbohydrate diet is the most successful way to lose weight and take it off long-term.  In order to achieve weight loss it is important that you track exactly what your calorie intake is during the day.  I usually recommend doing some sort of formal program or Belia. there are lot of good Apps out there to help you follow your calorie intake such as weight watchers, Noom, Fitbit.  It is recommended that you reduce the intake of sweets, sugar, reduce carbohydrate intake.  Healthy diets with more whole grains, vegetables, fruits, nuts and seeds with plant oils are healthier diets than animal-based fats.  Reduce your intake of white bread, grains, potatoes, processed foods.       If you have a BMI  (height per weight ratio)  that is > 30 or > 27  with risk factors such as diabetes, heart disease, high blood pressure or hypertension you may qualify for more intensive weight loss options.   has a comprehensive weight loss program which I can give you a referral for to aggressively work on weight loss through a dietitian, possible medications and possible surgical interventions.  There are also a few options for weight loss medications.  There is an over-the-counter medication called orlistat ( Jah ) this works to prevent fat absorption, it can cause some diarrhea.  There are a couple of prescription medications that sometimes can help with weight  Patient stated that she does not want to be woken up again for vital signs.     loss: Bupropion, topiramate, Contrave  The newest class of medications that are indicated for weight loss are the GLP2 medications  : Mounjaro, Saxenda and Wegovy, these medications are injectables but worked very well  I would recommend checking with your insurance to see what the cost of these medications would be for you and let me know if you would like to proceed with these medications.  If we do start these medications you will need to follow-up in a month for weight checks.  These medications are not long-term solutions, rather they get you started on weight loss.  You will need to continue with behavioral changes, dietary management and exercise to continue to keep the weight off       Due for repeat CT chest orders in the computer

## 2024-04-30 ENCOUNTER — APPOINTMENT (OUTPATIENT)
Dept: PRIMARY CARE | Facility: CLINIC | Age: 59
End: 2024-04-30
Payer: COMMERCIAL

## 2024-05-30 ENCOUNTER — OFFICE VISIT (OUTPATIENT)
Dept: PRIMARY CARE | Facility: CLINIC | Age: 59
End: 2024-05-30
Payer: COMMERCIAL

## 2024-05-30 VITALS
HEIGHT: 67 IN | SYSTOLIC BLOOD PRESSURE: 128 MMHG | WEIGHT: 293 LBS | HEART RATE: 76 BPM | BODY MASS INDEX: 45.99 KG/M2 | TEMPERATURE: 97.6 F | DIASTOLIC BLOOD PRESSURE: 84 MMHG | OXYGEN SATURATION: 96 %

## 2024-05-30 DIAGNOSIS — R59.1 LYMPHADENOPATHY: Primary | ICD-10-CM

## 2024-05-30 DIAGNOSIS — F41.9 ANXIETY: ICD-10-CM

## 2024-05-30 PROCEDURE — 3008F BODY MASS INDEX DOCD: CPT | Performed by: FAMILY MEDICINE

## 2024-05-30 PROCEDURE — 4004F PT TOBACCO SCREEN RCVD TLK: CPT | Performed by: FAMILY MEDICINE

## 2024-05-30 PROCEDURE — 99213 OFFICE O/P EST LOW 20 MIN: CPT | Performed by: FAMILY MEDICINE

## 2024-05-30 RX ORDER — CEFDINIR 300 MG/1
300 CAPSULE ORAL 2 TIMES DAILY
Qty: 14 CAPSULE | Refills: 0 | Status: SHIPPED | OUTPATIENT
Start: 2024-05-30 | End: 2024-06-06

## 2024-05-30 RX ORDER — BUSPIRONE HYDROCHLORIDE 7.5 MG/1
7.5 TABLET ORAL 2 TIMES DAILY PRN
Qty: 60 TABLET | Refills: 2 | Status: SHIPPED | OUTPATIENT
Start: 2024-05-30 | End: 2025-05-30

## 2024-05-30 ASSESSMENT — PATIENT HEALTH QUESTIONNAIRE - PHQ9
1. LITTLE INTEREST OR PLEASURE IN DOING THINGS: NOT AT ALL
2. FEELING DOWN, DEPRESSED OR HOPELESS: NOT AT ALL
SUM OF ALL RESPONSES TO PHQ9 QUESTIONS 1 AND 2: 0

## 2024-05-30 ASSESSMENT — ENCOUNTER SYMPTOMS: FEVER: 0

## 2024-05-30 NOTE — PROGRESS NOTES
"Subjective   Patient ID: Rashida Whitfield is a 58 y.o. female who presents for Mass behind her left ear pt found this two days ago;  tender to the touch. Pt also c/o headaches; taking OTC Excedrin in the mornings. Lost her job last Thursday; having an increase in panic and anxiety; not sleeping well at all.     2 days ago   Lump behind ear   Now with a headache    sore to touch   Feels warm   No drainage       Some anxiety , panic attacks recently   Lost job   Remote anxiety medication   Nothing recently       Weight gain recently          Review of Systems   Constitutional:  Negative for fever.       Objective   /84   Pulse 76   Temp 36.4 °C (97.6 °F)   Ht 1.702 m (5' 7\")   Wt 135 kg (298 lb)   SpO2 96%   BMI 46.67 kg/m²     Physical Exam  Constitutional:       Appearance: Normal appearance.   HENT:      Head: Normocephalic and atraumatic.      Right Ear: Tympanic membrane and ear canal normal.      Left Ear: Tympanic membrane and ear canal normal.      Nose: No nasal deformity.      Right Sinus: No maxillary sinus tenderness or frontal sinus tenderness.      Left Sinus: No maxillary sinus tenderness or frontal sinus tenderness.      Mouth/Throat:      Mouth: Mucous membranes are moist. No oral lesions.      Tongue: No lesions.      Pharynx: Oropharynx is clear.   Neck:      Comments: Tender at left occiput with mild adenopathy   Tender   Cardiovascular:      Rate and Rhythm: Normal rate and regular rhythm.   Pulmonary:      Effort: Pulmonary effort is normal.      Breath sounds: Normal breath sounds.   Musculoskeletal:      Cervical back: No tenderness.   Lymphadenopathy:      Cervical: No cervical adenopathy.   Neurological:      Mental Status: She is alert.   Psychiatric:         Mood and Affect: Mood normal.      Comments: tearful         Assessment/Plan   Problem List Items Addressed This Visit    None  Visit Diagnoses         Codes    Lymphadenopathy    -  Primary R59.1    Relevant Medications    " cefdinir (Omnicef) 300 mg capsule    Anxiety     F41.9    Relevant Medications    busPIRone (Buspar) 7.5 mg tablet

## 2024-05-30 NOTE — PATIENT INSTRUCTIONS
You may take over-the-counter medications as needed for symptom relief.  Call the office if your symptoms worsen such as high fever and worsening cough or increase in symptoms.     Add on buspirone for anxiety     Add antibiotic     Tylenol or motrin as needed     Let me know if sxs persist

## 2024-06-02 DIAGNOSIS — E78.2 HYPERLIPEMIA, MIXED: ICD-10-CM

## 2024-06-04 RX ORDER — ATORVASTATIN CALCIUM 10 MG/1
10 TABLET, FILM COATED ORAL DAILY
Qty: 90 TABLET | Refills: 1 | Status: SHIPPED | OUTPATIENT
Start: 2024-06-04

## 2024-07-11 ENCOUNTER — HOSPITAL ENCOUNTER (OUTPATIENT)
Dept: RADIOLOGY | Facility: HOSPITAL | Age: 59
Discharge: HOME | End: 2024-07-11
Payer: COMMERCIAL

## 2024-07-11 VITALS — HEIGHT: 67 IN | WEIGHT: 293 LBS | BODY MASS INDEX: 45.99 KG/M2

## 2024-07-11 DIAGNOSIS — Z12.31 ENCOUNTER FOR SCREENING MAMMOGRAM FOR MALIGNANT NEOPLASM OF BREAST: ICD-10-CM

## 2024-07-11 PROCEDURE — 77067 SCR MAMMO BI INCL CAD: CPT

## 2024-07-11 PROCEDURE — 77067 SCR MAMMO BI INCL CAD: CPT | Performed by: RADIOLOGY

## 2024-07-11 PROCEDURE — 77063 BREAST TOMOSYNTHESIS BI: CPT | Performed by: RADIOLOGY

## 2024-07-16 ENCOUNTER — APPOINTMENT (OUTPATIENT)
Dept: ORTHOPEDIC SURGERY | Facility: CLINIC | Age: 59
End: 2024-07-16
Payer: COMMERCIAL

## 2024-07-30 ENCOUNTER — APPOINTMENT (OUTPATIENT)
Dept: ORTHOPEDIC SURGERY | Facility: CLINIC | Age: 59
End: 2024-07-30
Payer: COMMERCIAL

## 2024-07-30 DIAGNOSIS — G89.29 CHRONIC PAIN OF LEFT KNEE: ICD-10-CM

## 2024-07-30 DIAGNOSIS — G89.29 CHRONIC PAIN OF RIGHT KNEE: Primary | ICD-10-CM

## 2024-07-30 DIAGNOSIS — M25.562 CHRONIC PAIN OF LEFT KNEE: ICD-10-CM

## 2024-07-30 DIAGNOSIS — M25.561 CHRONIC PAIN OF RIGHT KNEE: Primary | ICD-10-CM

## 2024-07-30 PROCEDURE — 4004F PT TOBACCO SCREEN RCVD TLK: CPT | Performed by: ORTHOPAEDIC SURGERY

## 2024-07-30 PROCEDURE — 20610 DRAIN/INJ JOINT/BURSA W/O US: CPT | Performed by: ORTHOPAEDIC SURGERY

## 2024-07-30 PROCEDURE — 99214 OFFICE O/P EST MOD 30 MIN: CPT | Performed by: ORTHOPAEDIC SURGERY

## 2024-07-30 RX ORDER — TRIAMCINOLONE ACETONIDE 40 MG/ML
2.5 INJECTION, SUSPENSION INTRA-ARTICULAR; INTRAMUSCULAR
Status: COMPLETED | OUTPATIENT
Start: 2024-07-30 | End: 2024-07-30

## 2024-07-30 ASSESSMENT — PAIN SCALES - GENERAL: PAINLEVEL_OUTOF10: 3

## 2024-07-30 ASSESSMENT — PAIN - FUNCTIONAL ASSESSMENT: PAIN_FUNCTIONAL_ASSESSMENT: 0-10

## 2024-07-30 NOTE — PROGRESS NOTES
This is a consultation from Dr. Yin Hamm MD for   Chief Complaint   Patient presents with    Left Knee - Pain    Right Knee - Pain       This is a 58 y.o. female who presents for follow-up for her bilateral knees.  Patient has bilateral knee arthritis, she had cortisone injections about 4 months ago.  They are very helpful and significantly improved her symptoms.  In the last few weeks she has had return of her symptoms exacerbation of her pain, sharp pain over the medial knee on both sides worse with walking.  No numbness or tingling no fevers or chills.  Has not used any braces or assistive devices.    Physical Exam    There has been no interval change in this patient's past medical, surgical, medications, allergies, family history or social history since the most recent visit to a provider within our department. 14 point review of systems was performed, reviewed, and negative except for pertinent positives documented in the history of present illness.     Constitutional: well developed, well nourished female in no acute distress  Psychiatric: normal mood, appropriate affect  Eyes: sclera anicteric  HENT: normocephalic/atraumatic  CV: regular rate and rhythm   Respiratory: non labored breathing  Integumentary: no rash  Neurological: moves all extremities    Bilateral knee exam: skin intact no lacerations or abrations. no effusion.  Tender medial joint line. negative log roll negative patellar grind. ROM 0-120. stable to varus and valgus stress at 0 and 30 degrees. negative lachman negative posterior drawer negative nidhi. 5/5 ehl/fhl/gs/ta. silt s/s/sp/dp/t. 2+ dp/pt        Xrays were ordered by me, they were reviewed and independently interpreted by me today, they show bilateral knee arthritis    L Inj/Asp: bilateral knee on 7/30/2024 11:39 AM  Indications: pain and joint swelling  Details: 22 G needle, anterolateral approach  Medications (Right): 2.5 mg triamcinolone acetonide 40 mg/mL  Medications  (Left): 2.5 mg triamcinolone acetonide 40 mg/mL    Discussion:  I discussed the conservative treatment options for knee osteoarthritis including but not limited to physical therapy, oral NSAIDS, activity and lifestyle modification, and corticosteroid injections. Pt has elected to undergo a cortisone injection today. I have explained the risk and benefits of an injection including the possibility of joint infection, bleeding, damage to cartilage, allergic reaction. Patient verbalized understanding and gave verbal consent wishes to proceed with a intra-articular cortisone injection for their knee.    Procedure:  After discussing the risk and benefits of the procedure, we proceeded with an intra-articular bilateral knee injection. We discussed the risks and benefits and potential morbidity related to the treatment, and to the prescription medication administered in the injection    With the patient's informed verbal consent, the bilateral knees were prepped in standard sterile fashion with Chlorhexidine. The skin was then anesthetized with ethyl chloride spray and cleaned again with Chlorhexidine. The bilateral knees were then apirated/injected with a prefilled 20-gauge syringe of 40 mg Kenalog + 4 ml Lidocaine using the lateral approach without complications.  The patient tolerated this well and felt immediate initial relief of symptoms. A bandaid was applied and the patient ambulated out of the clinic on ther own accord without difficulty. Patient was instructed to avoid physical activity for 24-48 hours to prevent the knees from swelling and may ice the knees as tolerated. Patient should contact the office if any signs of of infection appear: redness, fever, chills, drainage, swelling or warmth to the knees.  Pt understands that the injections can be repeated no sooner than 3 months.      Procedure, treatment alternatives, risks and benefits explained, specific risks discussed. Consent was given by the patient.  "Immediately prior to procedure a time out was called to verify the correct patient, procedure, equipment, support staff and site/side marked as required. Patient was prepped and draped in the usual sterile fashion.             Impression/Plan: This is a 58 y.o. female with bilateral knee arthritis.  I had an in depth discussion with the patient regarding treatment options for arthritis and their relative risks and benefits. We reviewed surgical and nonsurgical option for treatment. Treatments include anti inflammatory medications, physical therapy, weight loss, activity modification, use of assistive devices, injection therapies. We discussed current prescriptions and risks and benefits of continuation of prescription medication as apporpriate. We discussed that arthritis is often progressive over time, an in end stage arthritis surgical interventions can be considered, including arthroplasty. All questions were answered and the patient voiced their understanding.  Doing well with nonsurgical treatment to see her back.    BMI Readings from Last 1 Encounters:   07/11/24 46.20 kg/m²      Lab Results   Component Value Date    CREATININE 0.77 04/04/2024     Tobacco Use: High Risk (7/30/2024)    Patient History     Smoking Tobacco Use: Every Day     Smokeless Tobacco Use: Never     Passive Exposure: Not on file      Computed MELD 3.0 unavailable. One or more values for this score either were not found within the given timeframe or did not fit some other criterion.  Computed MELD-Na unavailable. One or more values for this score either were not found within the given timeframe or did not fit some other criterion.       Lab Results   Component Value Date    HGBA1C 6.0 (H) 04/04/2024     No results found for: \"STAPHMRSASCR\"  "

## 2024-08-27 ENCOUNTER — APPOINTMENT (OUTPATIENT)
Dept: ORTHOPEDIC SURGERY | Facility: CLINIC | Age: 59
End: 2024-08-27
Payer: COMMERCIAL

## 2024-08-29 ENCOUNTER — TELEPHONE (OUTPATIENT)
Dept: PRIMARY CARE | Facility: CLINIC | Age: 59
End: 2024-08-29
Payer: COMMERCIAL

## 2024-08-29 DIAGNOSIS — F17.210 CIGARETTE NICOTINE DEPENDENCE WITHOUT COMPLICATION: Primary | ICD-10-CM

## 2024-08-29 NOTE — TELEPHONE ENCOUNTER
MD Julita Leon MA  Caller: Unspecified (Today, 11:06 AM)  Ok order inputted          Previous Messages    Referral  (Newest Message First)  View All Conversations on this Encounter  You  Rashida Miller now (4:27 PM)       Dr. Hansel Huff has placed you lung screening test as requested.     This  [place] message has not been read.     Yin Hamm MD  You16 minutes ago (4:11 PM)       Ok order inputted     You routed conversation to Yin Hamm MD5 hours ago (11:25 AM)     An Saravia routed conversation to Do [facility] Clinical Support Staff5 hours ago (11:07 AM)     An Saravia5 hours ago (11:07 AM)     PL  Pt is in need of an order for her annual lung screening. Please advise pt when order is entered to chart.             Note        Rashida Whitfield [phone]  An Saravia5 hours ago (11:06 AM)   MSG sent via readeo.

## 2024-08-29 NOTE — TELEPHONE ENCOUNTER
Pt is in need of an order for her annual lung screening. Please advise pt when order is entered to chart.

## 2024-09-05 ENCOUNTER — LAB (OUTPATIENT)
Dept: LAB | Facility: LAB | Age: 59
End: 2024-09-05
Payer: COMMERCIAL

## 2024-09-05 ENCOUNTER — APPOINTMENT (OUTPATIENT)
Dept: PRIMARY CARE | Facility: CLINIC | Age: 59
End: 2024-09-05
Payer: COMMERCIAL

## 2024-09-05 VITALS
SYSTOLIC BLOOD PRESSURE: 136 MMHG | BODY MASS INDEX: 45.99 KG/M2 | OXYGEN SATURATION: 94 % | WEIGHT: 293 LBS | DIASTOLIC BLOOD PRESSURE: 87 MMHG | HEART RATE: 74 BPM | HEIGHT: 67 IN | TEMPERATURE: 98.2 F

## 2024-09-05 DIAGNOSIS — U09.9 POST COVID-19 CONDITION, UNSPECIFIED: ICD-10-CM

## 2024-09-05 DIAGNOSIS — R53.83 FATIGUE, UNSPECIFIED TYPE: ICD-10-CM

## 2024-09-05 DIAGNOSIS — R53.83 FATIGUE, UNSPECIFIED TYPE: Primary | ICD-10-CM

## 2024-09-05 PROBLEM — R73.9 HYPERGLYCEMIA: Status: ACTIVE | Noted: 2024-09-05

## 2024-09-05 PROBLEM — R51.9 HEADACHE: Status: RESOLVED | Noted: 2024-09-05 | Resolved: 2024-09-05

## 2024-09-05 PROBLEM — M25.521 ELBOW PAIN, RIGHT: Status: RESOLVED | Noted: 2023-02-16 | Resolved: 2024-09-05

## 2024-09-05 PROBLEM — G47.00 INSOMNIA: Status: ACTIVE | Noted: 2024-09-05

## 2024-09-05 PROBLEM — R19.7 DIARRHEA: Status: RESOLVED | Noted: 2024-09-05 | Resolved: 2024-09-05

## 2024-09-05 PROBLEM — U07.1 DISEASE DUE TO SEVERE ACUTE RESPIRATORY SYNDROME CORONAVIRUS 2 (SARS-COV-2): Status: RESOLVED | Noted: 2024-09-05 | Resolved: 2024-09-05

## 2024-09-05 PROBLEM — R35.0 INCREASED FREQUENCY OF URINATION: Status: ACTIVE | Noted: 2024-09-05

## 2024-09-05 PROBLEM — E88.810 METABOLIC SYNDROME: Status: ACTIVE | Noted: 2024-09-05

## 2024-09-05 PROBLEM — R35.0 INCREASED FREQUENCY OF URINATION: Status: RESOLVED | Noted: 2024-09-05 | Resolved: 2024-09-05

## 2024-09-05 PROBLEM — M25.562 LEFT KNEE PAIN: Status: RESOLVED | Noted: 2023-02-16 | Resolved: 2024-09-05

## 2024-09-05 PROBLEM — M77.11 LATERAL EPICONDYLITIS OF RIGHT ELBOW: Status: ACTIVE | Noted: 2024-09-05

## 2024-09-05 PROBLEM — L30.1 DYSHIDROSIS: Status: RESOLVED | Noted: 2023-02-16 | Resolved: 2024-09-05

## 2024-09-05 PROBLEM — R87.610 ATYPICAL SQUAMOUS CELLS OF UNDETERMINED SIGNIFICANCE (ASCUS) ON PAPANICOLAOU SMEAR OF CERVIX: Status: ACTIVE | Noted: 2024-09-05

## 2024-09-05 PROBLEM — R63.2 POLYPHAGIA: Status: RESOLVED | Noted: 2023-02-16 | Resolved: 2024-09-05

## 2024-09-05 PROBLEM — S86.019A RUPTURE OF ACHILLES TENDON: Status: ACTIVE | Noted: 2024-09-05

## 2024-09-05 PROBLEM — R51.9 HEADACHE: Status: ACTIVE | Noted: 2024-09-05

## 2024-09-05 PROBLEM — L40.4 GUTTATE PSORIASIS: Status: ACTIVE | Noted: 2024-09-05

## 2024-09-05 PROBLEM — L30.1 VESICULAR ECZEMA: Status: ACTIVE | Noted: 2023-02-16

## 2024-09-05 PROBLEM — M25.561 RIGHT KNEE PAIN: Status: RESOLVED | Noted: 2023-07-13 | Resolved: 2024-09-05

## 2024-09-05 PROBLEM — R87.610 ATYPICAL SQUAMOUS CELLS OF UNDETERMINED SIGNIFICANCE (ASCUS) ON PAPANICOLAOU SMEAR OF CERVIX: Status: RESOLVED | Noted: 2024-09-05 | Resolved: 2024-09-05

## 2024-09-05 PROBLEM — S59.902A INJURY OF ELBOW, LEFT: Status: RESOLVED | Noted: 2023-02-16 | Resolved: 2024-09-05

## 2024-09-05 LAB
ALBUMIN SERPL BCP-MCNC: 4 G/DL (ref 3.4–5)
ALP SERPL-CCNC: 85 U/L (ref 33–110)
ALT SERPL W P-5'-P-CCNC: 11 U/L (ref 7–45)
ANION GAP SERPL CALC-SCNC: 12 MMOL/L (ref 10–20)
AST SERPL W P-5'-P-CCNC: 14 U/L (ref 9–39)
BASOPHILS # BLD AUTO: 0.09 X10*3/UL (ref 0–0.1)
BASOPHILS NFR BLD AUTO: 1.1 %
BILIRUB SERPL-MCNC: 0.4 MG/DL (ref 0–1.2)
BUN SERPL-MCNC: 15 MG/DL (ref 6–23)
CALCIUM SERPL-MCNC: 9 MG/DL (ref 8.6–10.3)
CHLORIDE SERPL-SCNC: 103 MMOL/L (ref 98–107)
CO2 SERPL-SCNC: 30 MMOL/L (ref 21–32)
CREAT SERPL-MCNC: 0.92 MG/DL (ref 0.5–1.05)
EGFRCR SERPLBLD CKD-EPI 2021: 72 ML/MIN/1.73M*2
EOSINOPHIL # BLD AUTO: 0.34 X10*3/UL (ref 0–0.7)
EOSINOPHIL NFR BLD AUTO: 4.2 %
ERYTHROCYTE [DISTWIDTH] IN BLOOD BY AUTOMATED COUNT: 13.9 % (ref 11.5–14.5)
GLUCOSE SERPL-MCNC: 116 MG/DL (ref 74–99)
HCT VFR BLD AUTO: 43.6 % (ref 36–46)
HGB BLD-MCNC: 13.9 G/DL (ref 12–16)
IMM GRANULOCYTES # BLD AUTO: 0.03 X10*3/UL (ref 0–0.7)
IMM GRANULOCYTES NFR BLD AUTO: 0.4 % (ref 0–0.9)
LYMPHOCYTES # BLD AUTO: 1.98 X10*3/UL (ref 1.2–4.8)
LYMPHOCYTES NFR BLD AUTO: 24.6 %
MCH RBC QN AUTO: 29.5 PG (ref 26–34)
MCHC RBC AUTO-ENTMCNC: 31.9 G/DL (ref 32–36)
MCV RBC AUTO: 93 FL (ref 80–100)
MONOCYTES # BLD AUTO: 0.52 X10*3/UL (ref 0.1–1)
MONOCYTES NFR BLD AUTO: 6.5 %
NEUTROPHILS # BLD AUTO: 5.1 X10*3/UL (ref 1.2–7.7)
NEUTROPHILS NFR BLD AUTO: 63.2 %
NRBC BLD-RTO: 0 /100 WBCS (ref 0–0)
PLATELET # BLD AUTO: 213 X10*3/UL (ref 150–450)
POTASSIUM SERPL-SCNC: 4.5 MMOL/L (ref 3.5–5.3)
PROT SERPL-MCNC: 6.1 G/DL (ref 6.4–8.2)
RBC # BLD AUTO: 4.71 X10*6/UL (ref 4–5.2)
SODIUM SERPL-SCNC: 140 MMOL/L (ref 136–145)
TSH SERPL-ACNC: 0.89 MIU/L (ref 0.44–3.98)
WBC # BLD AUTO: 8.1 X10*3/UL (ref 4.4–11.3)

## 2024-09-05 PROCEDURE — 85025 COMPLETE CBC W/AUTO DIFF WBC: CPT

## 2024-09-05 PROCEDURE — 84443 ASSAY THYROID STIM HORMONE: CPT

## 2024-09-05 PROCEDURE — 3008F BODY MASS INDEX DOCD: CPT | Performed by: FAMILY MEDICINE

## 2024-09-05 PROCEDURE — 99214 OFFICE O/P EST MOD 30 MIN: CPT | Performed by: FAMILY MEDICINE

## 2024-09-05 PROCEDURE — 80053 COMPREHEN METABOLIC PANEL: CPT

## 2024-09-05 PROCEDURE — 4004F PT TOBACCO SCREEN RCVD TLK: CPT | Performed by: FAMILY MEDICINE

## 2024-09-05 PROCEDURE — 36415 COLL VENOUS BLD VENIPUNCTURE: CPT

## 2024-09-05 ASSESSMENT — PATIENT HEALTH QUESTIONNAIRE - PHQ9
SUM OF ALL RESPONSES TO PHQ9 QUESTIONS 1 AND 2: 0
1. LITTLE INTEREST OR PLEASURE IN DOING THINGS: NOT AT ALL
2. FEELING DOWN, DEPRESSED OR HOPELESS: NOT AT ALL

## 2024-09-05 ASSESSMENT — ENCOUNTER SYMPTOMS
FEVER: 0
SHORTNESS OF BREATH: 0
COUGH: 0
SINUS PRESSURE: 1
FATIGUE: 1

## 2024-09-05 NOTE — PROGRESS NOTES
"Subjective   Patient ID: Rashida Whitfield is a 58 y.o. female who presents for Follow-up on anxiety and covid. Tested positive on 8/6/24. Still feeling some sinus congestion and fatigue. Stopped the Buspirone after taking it for two months; felt more depressed on it and very tired. Not taking vitamin D.     Fatigue   Had covid a month ago   Sleeping a lot more   Getting a little better   Left ear clogged     Anxiety   Buspar did help but got fatigue   Felt more depression     No working now so not sure if that is contributing to her feeling down    Still smoking      Also interested in information about weight loss drugs         Review of Systems   Constitutional:  Positive for fatigue. Negative for fever.   HENT:  Positive for sinus pressure.    Respiratory:  Negative for cough and shortness of breath.        Objective   /87   Pulse 74   Temp 36.8 °C (98.2 °F)   Ht 1.702 m (5' 7\")   Wt 136 kg (300 lb)   SpO2 94%   BMI 46.99 kg/m²     Physical Exam  Constitutional:       Appearance: Normal appearance.   HENT:      Head: Normocephalic and atraumatic.      Right Ear: Tympanic membrane and ear canal normal.      Left Ear: Tympanic membrane and ear canal normal.      Nose: No nasal deformity.      Right Sinus: No maxillary sinus tenderness or frontal sinus tenderness.      Left Sinus: No maxillary sinus tenderness or frontal sinus tenderness.      Mouth/Throat:      Mouth: Mucous membranes are moist. No oral lesions.      Tongue: No lesions.      Pharynx: Oropharynx is clear.   Cardiovascular:      Rate and Rhythm: Normal rate and regular rhythm.   Pulmonary:      Effort: Pulmonary effort is normal.      Breath sounds: Normal breath sounds.   Musculoskeletal:      Cervical back: No tenderness.   Lymphadenopathy:      Cervical: No cervical adenopathy.   Neurological:      General: No focal deficit present.      Mental Status: She is alert and oriented to person, place, and time.   Psychiatric:         Mood and " Affect: Mood normal.         Assessment/Plan   Problem List Items Addressed This Visit             ICD-10-CM    Fatigue - Primary R53.83    Relevant Orders    Comprehensive Metabolic Panel    CBC and Auto Differential    Thyroid Stimulating Hormone     Other Visit Diagnoses         Codes    Post covid-19 condition, unspecified     U09.9    Relevant Orders    Comprehensive Metabolic Panel    CBC and Auto Differential    Thyroid Stimulating Hormone

## 2024-09-05 NOTE — PATIENT INSTRUCTIONS
Get your blood work as ordered.  You should hear from our office with results whether they are normal are not within a few days.  Please call the office if you do not hear from us.       Give it a little time    Let me know if worsening mood sxs         It is important to actively try to reduce your weight to become healthier.  There are several things you can do to try and lose weight.  You should be getting 30-45 minutes of cardiovascular exercise 3-5 times per week, activities such as running and jogging treadmill swimming and brisk walking are helpful.  You will also need to watch your portion control, decrease calorie intake overall.  Following a low carbohydrate diet is the most successful way to lose weight and take it off long-term.  In order to achieve weight loss it is important that you track exactly what your calorie intake is during the day.  I usually recommend doing some sort of formal program or Belia. there are lot of good Apps out there to help you follow your calorie intake such as weight watchers, Noom, Fitbit.  It is recommended that you reduce the intake of sweets, sugar, reduce carbohydrate intake.  Healthy diets with more whole grains, vegetables, fruits, nuts and seeds with plant oils are healthier diets than animal-based fats.  Reduce your intake of white bread, grains, potatoes, processed foods.        If you have a BMI  (height per weight ratio)  that is > 30 or > 27  with risk factors such as diabetes, heart disease, high blood pressure or hypertension you may qualify for more intensive weight loss options.   I can give you a referral for to aggressively work on weight loss through a dietitian.  There are many options for bariatric intervention, surgeries and procedures also.  There are also a few options for weight loss medications.  There is an over-the-counter medication called orlistat ( Jah ) this works to prevent fat absorption, it can cause some diarrhea.  There are a couple of  prescription medications that sometimes can help with weight loss: Bupropion, topiramate, Contrave    The newest class of medications that are indicated for weight loss are the GLP2 medications  : Mounjaro, Saxenda , Wegovy and Zepbound , these medications are injectables.  These medications  get you started on weight loss but you will need to continue with behavioral changes, dietary management and exercise to continue to keep the weight off.  If you are interested in trying 1 of these medications you need to check with your insurance formulary to see if they are covered.  If your insurance does not specifically cover the drug name Mounjaro, Saxenda,Wegovy or Zepbound WITHOUT a prior authorization then it is not covered for weight loss.    Ozempic,Semaglutide, Rybelsus, Trulicity are diabetic medications, not weight loss medications.  If your insurance states they need a prior authorization then they do not cover it for weight loss . We will not do prior authorizations for weight loss.  There are savings programs online for Zepbound and Wegovy, however these are only for commercially insured patients or self-pay patients.  Medicare does not cover any weight loss medications.  There are other options that may be more cost effective if your insurance company does not cover the medication.  There are some online pharmacies that are providing medications as well as some local weight loss clinics that provide the GLP-1 medications that may be cheaper out-of-pocket.    There is significant safety concerns about compounded injectable GLP-1 medications.  The ingredients may be coming from unknown sources so I would not recommend these.  There are some pharmacies that offer compounded sublingual semaglutide.  If you can find this medication supplied by a [facility] approved pharmacy that is using the FDA approved semaglutide this is a safer bet.    If you do start 1 of these medications you will need to see me every 2 to 3 months  for weight checks and reevaluations.

## 2024-09-19 ENCOUNTER — HOSPITAL ENCOUNTER (OUTPATIENT)
Dept: RADIOLOGY | Facility: HOSPITAL | Age: 59
Discharge: HOME | End: 2024-09-19
Payer: COMMERCIAL

## 2024-09-19 DIAGNOSIS — F17.210 CIGARETTE NICOTINE DEPENDENCE WITHOUT COMPLICATION: ICD-10-CM

## 2024-09-19 PROCEDURE — 71271 CT THORAX LUNG CANCER SCR C-: CPT

## 2024-10-10 ENCOUNTER — OFFICE VISIT (OUTPATIENT)
Dept: CARDIOLOGY | Facility: CLINIC | Age: 59
End: 2024-10-10
Payer: COMMERCIAL

## 2024-10-10 VITALS
WEIGHT: 293 LBS | BODY MASS INDEX: 45.99 KG/M2 | DIASTOLIC BLOOD PRESSURE: 84 MMHG | HEART RATE: 74 BPM | SYSTOLIC BLOOD PRESSURE: 128 MMHG | HEIGHT: 67 IN | RESPIRATION RATE: 16 BRPM | OXYGEN SATURATION: 96 %

## 2024-10-10 DIAGNOSIS — I25.10 ATHEROSCLEROSIS OF NATIVE CORONARY ARTERY OF NATIVE HEART WITHOUT ANGINA PECTORIS: ICD-10-CM

## 2024-10-10 DIAGNOSIS — E78.2 HYPERLIPEMIA, MIXED: ICD-10-CM

## 2024-10-10 PROCEDURE — 93005 ELECTROCARDIOGRAM TRACING: CPT | Performed by: INTERNAL MEDICINE

## 2024-10-10 PROCEDURE — 99204 OFFICE O/P NEW MOD 45 MIN: CPT | Performed by: INTERNAL MEDICINE

## 2024-10-10 PROCEDURE — 4004F PT TOBACCO SCREEN RCVD TLK: CPT | Performed by: INTERNAL MEDICINE

## 2024-10-10 PROCEDURE — 3008F BODY MASS INDEX DOCD: CPT | Performed by: INTERNAL MEDICINE

## 2024-10-10 PROCEDURE — 99214 OFFICE O/P EST MOD 30 MIN: CPT | Mod: 25 | Performed by: INTERNAL MEDICINE

## 2024-10-10 PROCEDURE — 93010 ELECTROCARDIOGRAM REPORT: CPT | Performed by: INTERNAL MEDICINE

## 2024-10-10 RX ORDER — IBUPROFEN 200 MG
200 TABLET ORAL EVERY 6 HOURS
COMMUNITY

## 2024-10-10 RX ORDER — OMEPRAZOLE 20 MG/1
CAPSULE, DELAYED RELEASE ORAL DAILY
COMMUNITY

## 2024-10-10 RX ORDER — BISMUTH SUBSALICYLATE 262 MG
1 TABLET,CHEWABLE ORAL DAILY
COMMUNITY

## 2024-10-10 RX ORDER — ATORVASTATIN CALCIUM 20 MG/1
20 TABLET, FILM COATED ORAL DAILY
Qty: 90 TABLET | Refills: 3 | Status: SHIPPED | OUTPATIENT
Start: 2024-10-10 | End: 2025-10-10

## 2024-10-10 ASSESSMENT — ENCOUNTER SYMPTOMS
HEADACHES: 0
DEPRESSION: 0
COUGH: 0
CHILLS: 0
DIARRHEA: 0
MYALGIAS: 0
MEMORY LOSS: 0
ABDOMINAL PAIN: 0
FEVER: 0
WHEEZING: 0
NAUSEA: 0
HEMOPTYSIS: 0
DYSURIA: 0
FALLS: 0
ALTERED MENTAL STATUS: 0
BLOATING: 0
HEMATURIA: 0
VOMITING: 0
CONSTIPATION: 0

## 2024-10-10 NOTE — PROGRESS NOTES
Chief Complaint   Patient presents with    Coronary calcification       HPI  57 yo WF w/ h/o CAC, HLD, [place] (intol CPAP), TOB now here for cardiology consult. Her main complaint is fatigue. +insomnia. No chest pain. No dyspnea at rest. +occ mild JIMENEZ (mod exertion). No orthopnea/PND. No palps. No LH/dizzy/syncope. No edema. No claudication. No cough. She has lost ~10 lbs with weight watchers.  She walks dog ~40 minutes/day with no symptoms.  ECG 10/24: SR (66), normal  Echo 6/22: EF 60%  [facility] 8/17: no ischemia, EF 65% -> >75%  CTA chest 7/17: no PE  CT lung 4/22: mild CAC  CT lung 6/23: mod CAC  CT lung 9/23: mild-mod CAC  CT lung 9/24: , nl heart size, no peric eff, mild AA, no aneurysm, nl PA  RLE US 7/17: no DVT    Review of Systems   Constitutional: Negative for chills, fever and malaise/fatigue.   HENT:  Negative for hearing loss.    Eyes:  Negative for visual disturbance.   Respiratory:  Negative for cough, hemoptysis and wheezing.    Skin:  Negative for rash.   Musculoskeletal:  Negative for falls and myalgias.   Gastrointestinal:  Negative for bloating, abdominal pain, constipation, diarrhea, dysphagia, nausea and vomiting.   Genitourinary:  Negative for dysuria and hematuria.   Neurological:  Negative for headaches.   Psychiatric/Behavioral:  Negative for altered mental status, depression and memory loss.       Social History     Tobacco Use    Smoking status: Every Day     Current packs/day: 0.50     Average packs/day: 0.5 packs/day for 43.0 years (21.5 ttl pk-yrs)     Types: Cigarettes    Smokeless tobacco: Never   Substance Use Topics    Alcohol use: Not Currently      Family History   Problem Relation Name Age of Onset    Diabetes Mother Ana     Hypertension Mother Ana     Kidney disease Mother Ana     Kidney cancer Mother Ana     Psoriasis Mother Ana     Diabetes type II Mother Ana     Heart attack Mother Ana     Hypertension Father Lewis         H/O HEART TRANSPARENT     "Rheumatic fever Father Lewis     Angina Father Lewis     Heart failure Father Lewis     Breast cancer Mother's Sister      Ovarian cancer Mother's Sister        No Known Allergies     Current Outpatient Medications   Medication Instructions    aspirin-acetaminophen-caffeine (Excedrin Migraine) 250-250-65 mg tablet 1 tablet, oral, Every 6 hours PRN    atorvastatin (LIPITOR) 10 mg, oral, Daily    ibuprofen 200 mg, oral, Every 6 hours    multivitamin tablet 1 tablet, oral, Daily    omeprazole (PriLOSEC) 20 mg DR capsule oral, Daily, Unsure of dose.OTC      /84 (BP Location: Right arm)   Pulse 74   Resp 16   Ht 1.702 m (5' 7\")   Wt 133 kg (293 lb)   SpO2 96%   BMI 45.89 kg/m²       Physical Exam  Constitutional:       Appearance: Normal appearance.   HENT:      Head: Normocephalic and atraumatic.      Nose: Nose normal.   Neck:      Vascular: No carotid bruit.   Cardiovascular:      Rate and Rhythm: Normal rate and regular rhythm.      Heart sounds: No murmur heard.  Pulmonary:      Effort: Pulmonary effort is normal.      Breath sounds: Normal breath sounds.   Abdominal:      Palpations: Abdomen is soft.      Tenderness: There is no abdominal tenderness.   Musculoskeletal:      Right lower leg: No edema.      Left lower leg: No edema.   Skin:     General: Skin is warm and dry.   Neurological:      General: No focal deficit present.      Mental Status: She is alert.   Psychiatric:         Mood and Affect: Mood normal.         Judgment: Judgment normal.        Lab Results   Component Value Date    CR 0.92      HGB 13.9      K 4.5      MG      BNP                 TSH 0.89      Assessment/Plan   59 yo WF w/ h/o CAC, HLD, [place] (intol CPAP), TOB. Doing well. No sig cardiac symptoms. ECG normal. Fatigue likely related to [place] and insomnia. Continue FU with sleep med.  -continue ASA daily (she takes Excedrin daily)  -increase Atorva from 10 to 20 every day -> goal LDL at least <100, optimal <70  -encourage " TOB cessation  -encourage continued diet/exercise (wgt loss)  -VANE Frias MD

## 2024-10-11 LAB
ATRIAL RATE: 66 BPM
P AXIS: 69 DEGREES
P OFFSET: 197 MS
P ONSET: 142 MS
PR INTERVAL: 162 MS
Q ONSET: 223 MS
QRS COUNT: 11 BEATS
QRS DURATION: 88 MS
QT INTERVAL: 384 MS
QTC CALCULATION(BAZETT): 402 MS
QTC FREDERICIA: 396 MS
R AXIS: 47 DEGREES
T AXIS: 71 DEGREES
T OFFSET: 415 MS
VENTRICULAR RATE: 66 BPM

## 2024-12-09 ENCOUNTER — APPOINTMENT (OUTPATIENT)
Dept: PRIMARY CARE | Facility: CLINIC | Age: 59
End: 2024-12-09
Payer: COMMERCIAL

## 2024-12-09 ENCOUNTER — LAB (OUTPATIENT)
Dept: LAB | Facility: LAB | Age: 59
End: 2024-12-09
Payer: COMMERCIAL

## 2024-12-09 ENCOUNTER — HOSPITAL ENCOUNTER (OUTPATIENT)
Dept: RADIOLOGY | Facility: CLINIC | Age: 59
Discharge: HOME | End: 2024-12-09
Payer: COMMERCIAL

## 2024-12-09 VITALS
HEIGHT: 67 IN | OXYGEN SATURATION: 93 % | HEART RATE: 62 BPM | SYSTOLIC BLOOD PRESSURE: 127 MMHG | WEIGHT: 293 LBS | BODY MASS INDEX: 45.99 KG/M2 | DIASTOLIC BLOOD PRESSURE: 84 MMHG | TEMPERATURE: 98 F

## 2024-12-09 DIAGNOSIS — L30.1 DYSHIDROSIS: ICD-10-CM

## 2024-12-09 DIAGNOSIS — R73.9 HYPERGLYCEMIA: ICD-10-CM

## 2024-12-09 DIAGNOSIS — M79.10 MYALGIA: ICD-10-CM

## 2024-12-09 DIAGNOSIS — M25.562 CHRONIC PAIN OF BOTH KNEES: ICD-10-CM

## 2024-12-09 DIAGNOSIS — M25.59 PAIN IN OTHER JOINT: ICD-10-CM

## 2024-12-09 DIAGNOSIS — M25.561 CHRONIC PAIN OF BOTH KNEES: ICD-10-CM

## 2024-12-09 DIAGNOSIS — E66.01 MORBID OBESITY WITH BMI OF 40.0-44.9, ADULT (MULTI): ICD-10-CM

## 2024-12-09 DIAGNOSIS — G89.29 CHRONIC PAIN OF BOTH KNEES: ICD-10-CM

## 2024-12-09 DIAGNOSIS — J30.9 ALLERGIC RHINITIS, UNSPECIFIED SEASONALITY, UNSPECIFIED TRIGGER: ICD-10-CM

## 2024-12-09 DIAGNOSIS — M25.59 PAIN IN OTHER JOINT: Primary | ICD-10-CM

## 2024-12-09 PROBLEM — R53.83 FATIGUE: Status: RESOLVED | Noted: 2024-09-05 | Resolved: 2024-12-09

## 2024-12-09 PROBLEM — R92.8 ABNORMAL MAMMOGRAM OF RIGHT BREAST: Status: RESOLVED | Noted: 2023-02-16 | Resolved: 2024-12-09

## 2024-12-09 PROBLEM — J32.9 CHRONIC SINUSITIS: Status: RESOLVED | Noted: 2023-05-30 | Resolved: 2024-12-09

## 2024-12-09 PROBLEM — H02.826: Status: RESOLVED | Noted: 2023-05-30 | Resolved: 2024-12-09

## 2024-12-09 PROBLEM — S86.019A RUPTURE OF ACHILLES TENDON: Status: RESOLVED | Noted: 2024-09-05 | Resolved: 2024-12-09

## 2024-12-09 PROBLEM — M77.11 LATERAL EPICONDYLITIS OF RIGHT ELBOW: Status: RESOLVED | Noted: 2024-09-05 | Resolved: 2024-12-09

## 2024-12-09 PROBLEM — R29.898 WEAKNESS OF LEFT LOWER EXTREMITY: Status: RESOLVED | Noted: 2023-02-16 | Resolved: 2024-12-09

## 2024-12-09 LAB
CK SERPL-CCNC: 67 U/L (ref 0–215)
ERYTHROCYTE [SEDIMENTATION RATE] IN BLOOD BY WESTERGREN METHOD: 25 MM/H (ref 0–30)
URATE SERPL-MCNC: 7.1 MG/DL (ref 2.3–6.7)

## 2024-12-09 PROCEDURE — 3008F BODY MASS INDEX DOCD: CPT | Performed by: FAMILY MEDICINE

## 2024-12-09 PROCEDURE — 82550 ASSAY OF CK (CPK): CPT

## 2024-12-09 PROCEDURE — 73630 X-RAY EXAM OF FOOT: CPT | Mod: RIGHT SIDE | Performed by: RADIOLOGY

## 2024-12-09 PROCEDURE — 99214 OFFICE O/P EST MOD 30 MIN: CPT | Performed by: FAMILY MEDICINE

## 2024-12-09 PROCEDURE — 85652 RBC SED RATE AUTOMATED: CPT

## 2024-12-09 PROCEDURE — 86003 ALLG SPEC IGE CRUDE XTRC EA: CPT

## 2024-12-09 PROCEDURE — 36415 COLL VENOUS BLD VENIPUNCTURE: CPT

## 2024-12-09 PROCEDURE — 86038 ANTINUCLEAR ANTIBODIES: CPT

## 2024-12-09 PROCEDURE — 4004F PT TOBACCO SCREEN RCVD TLK: CPT | Performed by: FAMILY MEDICINE

## 2024-12-09 PROCEDURE — 73630 X-RAY EXAM OF FOOT: CPT | Mod: RT

## 2024-12-09 PROCEDURE — 84550 ASSAY OF BLOOD/URIC ACID: CPT

## 2024-12-09 PROCEDURE — 82785 ASSAY OF IGE: CPT

## 2024-12-09 RX ORDER — DICLOFENAC SODIUM 75 MG/1
75 TABLET, DELAYED RELEASE ORAL 2 TIMES DAILY PRN
Qty: 180 TABLET | Refills: 0 | Status: SHIPPED | OUTPATIENT
Start: 2024-12-09 | End: 2025-03-09

## 2024-12-09 ASSESSMENT — ENCOUNTER SYMPTOMS
CONSTIPATION: 0
DIARRHEA: 0

## 2024-12-09 NOTE — PROGRESS NOTES
"Subjective   Patient ID: Rashida Whitfield is a 59 y.o. female who presents for widespread Joint Pain. Weather plays a role; taking Ibuprofen or Excedrin Migraine. Sx's flare-up 4-5 times a month. States her friend has Diclofenac and she tried it for her sx's; seems to work pretty well. Right foot has been painful for one month w/o injury.         Generalized pain with activity   Change in weather   Pain with walking   All day     A few times per month   Over the last year     Shoulders neck feet ankles elbows   Not fingers       Few years of atorvastatin   Increased dose recently   Achiness all over at one times   No edema     Tried diclofenac no problems     Taking excedrin daily in AM   For pain       Chronic dihdrosis   Allergies , no testing in past                    Review of Systems   Gastrointestinal:  Negative for constipation and diarrhea.       Objective   /84   Pulse 62   Temp 36.7 °C (98 °F)   Ht 1.702 m (5' 7\")   Wt 134 kg (295 lb)   SpO2 93%   BMI 46.20 kg/m²     Physical Exam  Constitutional:       Appearance: Normal appearance. She is well-developed.   Cardiovascular:      Rate and Rhythm: Normal rate and regular rhythm.      Heart sounds: Normal heart sounds. No murmur heard.  Pulmonary:      Effort: Pulmonary effort is normal.      Breath sounds: Normal breath sounds.   Musculoskeletal:      Comments: No joint swelling  No palpable tenderness along the hips  Some knee tenderness on palpation  No tenderness along the hand joints  Right foot with some tenderness along the bunion  No erythema or warmth   Skin:     Comments: Peeling on the hands and feet consistent with dyshidrosis   Neurological:      General: No focal deficit present.      Mental Status: She is alert and oriented to person, place, and time.   Psychiatric:         Mood and Affect: Mood normal.         Behavior: Behavior normal.         Assessment/Plan   Problem List Items Addressed This Visit             ICD-10-CM    " Allergic rhinitis J30.9    Relevant Orders    CK    Sedimentation Rate    AZUL with Reflex to HECTOR    Uric Acid    Food Allergy Profile IgE    Respiratory Allergy Profile IgE    XR foot right 3+ views    Morbid obesity with BMI of 40.0-44.9, adult (Multi) E66.01, Z68.41    Hyperglycemia R73.9    Relevant Orders    CK    Sedimentation Rate    AZUL with Reflex to HECTOR    Uric Acid    Food Allergy Profile IgE    Respiratory Allergy Profile IgE    XR foot right 3+ views     Other Visit Diagnoses         Codes    Pain in other joint    -  Primary M25.59    Relevant Medications    diclofenac (Voltaren) 75 mg EC tablet    Other Relevant Orders    CK    Sedimentation Rate    AZUL with Reflex to HECTOR    Uric Acid    Food Allergy Profile IgE    Respiratory Allergy Profile IgE    XR foot right 3+ views    Dyshidrosis     L30.1    Relevant Orders    CK    Sedimentation Rate    AZUL with Reflex to HECTOR    Uric Acid    Food Allergy Profile IgE    Respiratory Allergy Profile IgE    XR foot right 3+ views    Myalgia     M79.10    Relevant Orders    CK    Sedimentation Rate    AZUL with Reflex to HECTOR    Uric Acid    Food Allergy Profile IgE    Respiratory Allergy Profile IgE    XR foot right 3+ views    Chronic pain of both knees     M25.561, M25.562, G89.29    Relevant Medications    diclofenac (Voltaren) 75 mg EC tablet    Other Relevant Orders    CK    Sedimentation Rate    AZUL with Reflex to HECTOR    Uric Acid    Food Allergy Profile IgE    Respiratory Allergy Profile IgE    XR foot right 3+ views

## 2024-12-10 LAB
A ALTERNATA IGE QN: <0.1 KU/L
A FUMIGATUS IGE QN: <0.1 KU/L
ANA SER QL HEP2 SUBST: NEGATIVE
BERMUDA GRASS IGE QN: <0.1 KU/L
BOXELDER IGE QN: <0.1 KU/L
C HERBARUM IGE QN: <0.1 KU/L
CALIF WALNUT POLN IGE QN: <0.1 KU/L
CAT DANDER IGE QN: <0.1 KU/L
CLAM IGE QN: <0.1 KU/L
CMN PIGWEED IGE QN: <0.1 KU/L
CODFISH IGE QN: <0.1 KU/L
COMMON RAGWEED IGE QN: <0.1 KU/L
CORN IGE QN: <0.1
COTTONWOOD IGE QN: <0.1 KU/L
D FARINAE IGE QN: <0.1 KU/L
D PTERONYSS IGE QN: <0.1 KU/L
DOG DANDER IGE QN: <0.1 KU/L
EGG WHITE IGE QN: <0.1 KU/L
ENGL PLANTAIN IGE QN: <0.1 KU/L
GOOSEFOOT IGE QN: <0.1 KU/L
JOHNSON GRASS IGE QN: <0.1 KU/L
KENT BLUE GRASS IGE QN: <0.1 KU/L
LONDON PLANE IGE QN: <0.1 KU/L
MILK IGE QN: <0.1 KU/L
MT JUNIPER IGE QN: <0.1 KU/L
P NOTATUM IGE QN: <0.1 KU/L
PEANUT IGE QN: <0.1 KU/L
PECAN/HICK TREE IGE QN: <0.1 KU/L
ROACH IGE QN: <0.1 KU/L
SALTWORT IGE QN: <0.1 KU/L
SCALLOP IGE QN: <0.1 KU/L
SESAME SEED IGE QN: <0.1 KU/L
SHEEP SORREL IGE QN: <0.1 KU/L
SHRIMP IGE QN: <0.1 KU/L
SILVER BIRCH IGE QN: <0.1 KU/L
SOYBEAN IGE QN: <0.1 KU/L
TIMOTHY IGE QN: <0.1 KU/L
TOTAL IGE SMQN RAST: 46.6 KU/L
WALNUT IGE QN: <0.1 KU/L
WHEAT IGE QN: <0.1 KU/L
WHITE ASH IGE QN: <0.1 KU/L
WHITE ELM IGE QN: <0.1 KU/L
WHITE MULBERRY IGE QN: <0.1 KU/L
WHITE OAK IGE QN: <0.1 KU/L

## 2024-12-11 NOTE — RESULT ENCOUNTER NOTE
Mild arthritis, little bit of heel spur  If persisting issues with pain would suggest seeing podiatry

## 2024-12-11 NOTE — RESULT ENCOUNTER NOTE
Labs were all normal except slightly elevated uric acid, this can be associated with gout but her presentation is not quite consistent with that, take the diclofenac as needed  If you have acute swelling with redness of the joints we may want to consider treating for gout

## 2024-12-26 ENCOUNTER — OFFICE VISIT (OUTPATIENT)
Dept: PRIMARY CARE | Facility: CLINIC | Age: 59
End: 2024-12-26
Payer: COMMERCIAL

## 2024-12-26 ENCOUNTER — LAB (OUTPATIENT)
Dept: LAB | Facility: LAB | Age: 59
End: 2024-12-26
Payer: COMMERCIAL

## 2024-12-26 VITALS
SYSTOLIC BLOOD PRESSURE: 110 MMHG | HEIGHT: 67 IN | HEART RATE: 78 BPM | DIASTOLIC BLOOD PRESSURE: 64 MMHG | BODY MASS INDEX: 44.89 KG/M2 | OXYGEN SATURATION: 98 % | TEMPERATURE: 98.3 F | WEIGHT: 286 LBS

## 2024-12-26 DIAGNOSIS — R19.7 DIARRHEA, UNSPECIFIED TYPE: ICD-10-CM

## 2024-12-26 DIAGNOSIS — R19.7 DIARRHEA, UNSPECIFIED TYPE: Primary | ICD-10-CM

## 2024-12-26 LAB
ALBUMIN SERPL BCP-MCNC: 4 G/DL (ref 3.4–5)
ALP SERPL-CCNC: 78 U/L (ref 33–110)
ALT SERPL W P-5'-P-CCNC: 10 U/L (ref 7–45)
ANION GAP SERPL CALC-SCNC: 18 MMOL/L (ref 10–20)
AST SERPL W P-5'-P-CCNC: 12 U/L (ref 9–39)
BASOPHILS # BLD AUTO: 0.06 X10*3/UL (ref 0–0.1)
BASOPHILS NFR BLD AUTO: 0.7 %
BILIRUB SERPL-MCNC: 0.3 MG/DL (ref 0–1.2)
BUN SERPL-MCNC: 11 MG/DL (ref 6–23)
CALCIUM SERPL-MCNC: 8.8 MG/DL (ref 8.6–10.3)
CHLORIDE SERPL-SCNC: 102 MMOL/L (ref 98–107)
CO2 SERPL-SCNC: 30 MMOL/L (ref 21–32)
CREAT SERPL-MCNC: 0.95 MG/DL (ref 0.5–1.05)
EGFRCR SERPLBLD CKD-EPI 2021: 69 ML/MIN/1.73M*2
EOSINOPHIL # BLD AUTO: 0.29 X10*3/UL (ref 0–0.7)
EOSINOPHIL NFR BLD AUTO: 3.3 %
ERYTHROCYTE [DISTWIDTH] IN BLOOD BY AUTOMATED COUNT: 14.4 % (ref 11.5–14.5)
GLUCOSE SERPL-MCNC: 107 MG/DL (ref 74–99)
HCT VFR BLD AUTO: 43.1 % (ref 36–46)
HGB BLD-MCNC: 13.7 G/DL (ref 12–16)
IMM GRANULOCYTES # BLD AUTO: 0.02 X10*3/UL (ref 0–0.7)
IMM GRANULOCYTES NFR BLD AUTO: 0.2 % (ref 0–0.9)
LYMPHOCYTES # BLD AUTO: 1.95 X10*3/UL (ref 1.2–4.8)
LYMPHOCYTES NFR BLD AUTO: 22.2 %
MCH RBC QN AUTO: 29.5 PG (ref 26–34)
MCHC RBC AUTO-ENTMCNC: 31.8 G/DL (ref 32–36)
MCV RBC AUTO: 93 FL (ref 80–100)
MONOCYTES # BLD AUTO: 0.76 X10*3/UL (ref 0.1–1)
MONOCYTES NFR BLD AUTO: 8.6 %
NEUTROPHILS # BLD AUTO: 5.71 X10*3/UL (ref 1.2–7.7)
NEUTROPHILS NFR BLD AUTO: 65 %
NRBC BLD-RTO: 0 /100 WBCS (ref 0–0)
PLATELET # BLD AUTO: 244 X10*3/UL (ref 150–450)
POTASSIUM SERPL-SCNC: 5 MMOL/L (ref 3.5–5.3)
PROT SERPL-MCNC: 6.3 G/DL (ref 6.4–8.2)
RBC # BLD AUTO: 4.65 X10*6/UL (ref 4–5.2)
SODIUM SERPL-SCNC: 145 MMOL/L (ref 136–145)
WBC # BLD AUTO: 8.8 X10*3/UL (ref 4.4–11.3)

## 2024-12-26 PROCEDURE — 80053 COMPREHEN METABOLIC PANEL: CPT

## 2024-12-26 PROCEDURE — 4004F PT TOBACCO SCREEN RCVD TLK: CPT | Performed by: FAMILY MEDICINE

## 2024-12-26 PROCEDURE — 85025 COMPLETE CBC W/AUTO DIFF WBC: CPT

## 2024-12-26 PROCEDURE — 3008F BODY MASS INDEX DOCD: CPT | Performed by: FAMILY MEDICINE

## 2024-12-26 PROCEDURE — 99214 OFFICE O/P EST MOD 30 MIN: CPT | Performed by: FAMILY MEDICINE

## 2024-12-26 ASSESSMENT — ENCOUNTER SYMPTOMS
FEVER: 0
NAUSEA: 0
ABDOMINAL PAIN: 0
VOMITING: 0
DIARRHEA: 1

## 2024-12-26 NOTE — PROGRESS NOTES
"Subjective   Patient ID: Rashida Whitfield is a 59 y.o. female who presents for Diarrhea and Gas. Sx's onset one week ago; took Imodium when her sx's first onset for about 4 days.    Diarrhea over last 7 days     Took immodium for a few days with some relief  Stopped it 4 days ago   Lots of diarrhea :  6-8 times per day   Yesterday once then ate and got it again     3 times this morning , slightly formed this AM     No dizziness   Started electrolyte fluids     No specific trigger ,  no recent travel     Some gas   No blood in stool   No recent Abx     Not taking voltaren            Review of Systems   Constitutional:  Negative for fever.   Gastrointestinal:  Positive for diarrhea. Negative for abdominal pain, nausea and vomiting.       Objective   /64   Pulse 78   Temp 36.8 °C (98.3 °F)   Ht 1.702 m (5' 7\")   Wt 130 kg (286 lb)   SpO2 98%   BMI 44.79 kg/m²     Physical Exam  Constitutional:       Appearance: Normal appearance. She is well-developed.   HENT:      Mouth/Throat:      Comments: Mucous membranes moist  Cardiovascular:      Rate and Rhythm: Normal rate and regular rhythm.      Heart sounds: Normal heart sounds. No murmur heard.  Pulmonary:      Effort: Pulmonary effort is normal.      Breath sounds: Normal breath sounds.   Abdominal:      General: Abdomen is flat.      Palpations: Abdomen is soft.   Neurological:      General: No focal deficit present.      Mental Status: She is alert and oriented to person, place, and time.   Psychiatric:         Mood and Affect: Mood normal.         Assessment/Plan   Problem List Items Addressed This Visit    None  Visit Diagnoses         Codes    Diarrhea, unspecified type    -  Primary R19.7    Relevant Orders    CBC and Auto Differential    Comprehensive Metabolic Panel    C. difficile, PCR    Calprotectin, Fecal    Ova/Para + Giardia/Cryptosporidium Antigen    Stool Pathogen Panel, PCR               "

## 2024-12-26 NOTE — PATIENT INSTRUCTIONS
I recommend a BRAT diet : Bananas, Applesauce, Rice and Toast.  Gradually increase intake of mild foods as tolerated.    Push the fluid intake: Adults should get at least 7-9 cups per day ,  Children Age 1-2 : 3-4 cups per day, age 4-8 4-5 cups per day, Age 9-12 6-8 cups per day, teens 9-11 cups per day.   If you have diarrhea, eating yogurt or taking a probiotic can help.    If your symptoms worsen or you are not improving in the next 1-2 days call the office.    Probiotics     Pepto bismol

## 2024-12-30 ENCOUNTER — LAB (OUTPATIENT)
Dept: LAB | Facility: LAB | Age: 59
End: 2024-12-30
Payer: COMMERCIAL

## 2024-12-30 LAB
C COLI+JEJ+UPSA DNA STL QL NAA+PROBE: NOT DETECTED
C DIF TOX TCDA+TCDB STL QL NAA+PROBE: NOT DETECTED
EC STX1 GENE STL QL NAA+PROBE: NOT DETECTED
EC STX2 GENE STL QL NAA+PROBE: NOT DETECTED
NOROVIRUS GI + GII RNA STL NAA+PROBE: NOT DETECTED
RV RNA STL NAA+PROBE: NOT DETECTED
SALMONELLA DNA STL QL NAA+PROBE: NOT DETECTED
SHIGELLA DNA SPEC QL NAA+PROBE: NOT DETECTED
V CHOLERAE DNA STL QL NAA+PROBE: NOT DETECTED
Y ENTEROCOL DNA STL QL NAA+PROBE: NOT DETECTED

## 2024-12-30 PROCEDURE — 87506 IADNA-DNA/RNA PROBE TQ 6-11: CPT

## 2024-12-30 PROCEDURE — 87493 C DIFF AMPLIFIED PROBE: CPT

## 2025-01-02 LAB
CALPROTECTIN STL-MCNT: 783 UG/G
CRYPTOSP AG STL QL IA: NEGATIVE
G LAMBLIA AG STL QL IA: NEGATIVE

## 2025-01-05 LAB — O+P STL MICRO: NEGATIVE

## 2025-01-06 ENCOUNTER — TELEPHONE (OUTPATIENT)
Dept: PRIMARY CARE | Facility: CLINIC | Age: 60
End: 2025-01-06
Payer: COMMERCIAL

## 2025-01-06 DIAGNOSIS — R19.7 DIARRHEA, UNSPECIFIED TYPE: Primary | ICD-10-CM

## 2025-01-06 NOTE — TELEPHONE ENCOUNTER
Result Communication    Resulted Orders   C. difficile, PCR   Result Value Ref Range    C. difficile, PCR Not Detected Not Detected    Narrative    This test is an FDA-cleared real-time PCR assay for detection of toxigenic C. difficile DNA from unprocessed liquid or unformed stool specimens that have not undergone nucleic acid extraction in symptomatic patients with potential C. difficile infection (CDI). A positive result may indicate colonization, and clinical assessment is required for the diagnosis of CDI. This test cannot be performed on formed stools or used as a test of cure, and should not be performed more than once per 7 days.   Calprotectin, Fecal   Result Value Ref Range    Calprotectin, Stool 783 (H) <=49 ug/g      Comment:      REFERENCE INTERVAL: Calprotectin, Fecal by Immunoassay      Less than 50 ug/g.........Normal     ug/g...............Borderline elevated, test should be                              re-evaluated in 4-6 weeks.    121 ug/g or greater.......Elevated  Performed By: [facility]  [address] [place] [id number]  : Tristen Gant MD, PhD  CLIA Number: [id number]   Stool Pathogen Panel, PCR   Result Value Ref Range    Campylobacter Group Not Detected Not Detected    Salmonella species Not Detected Not Detected    Shigella species Not Detected Not Detected    Vibrio Group Not Detected Not Detected    Yersinia Enterocolitica Not Detected Not Detected    Shiga Toxin 1 Not Detected Not Detected    Shiga Toxin 2 Not Detected Not Detected    Norovirus GI/GII Not Detected Not Detected    Rotavirus A Not Detected Not Detected   Ova and Parasite Examination   Result Value Ref Range    Ova + Parasite Exam Negative Negative      Comment:        INTERPRETIVE INFORMATION: Ova and Parasite, Fecal    Method for identification of Ova and Parasites includes wet mount   and trichrome stains.    Due to the various shedding cycles of many parasites, three    separate stool specimens collected over a 5-7-day period are   recommended for ova and parasite examination. A single negative   result does not rule out the possibility of a parasitic infection.   The ova and parasite exam does not specifically detect   Cryptosporidium, Cyclospora, Cystoisospora, and Microsporidia. For   additional test information refer to [facility] consult,   [email or web address]  Performed By: [facility]  [address] [place] [address]  : Tristen Gant MD, PhD  CLIA Number: [id number]   Giardia Antigen   Result Value Ref Range    Giardia lamblia Antigen Negative Negative      Comment:      Performed By: [facility]  [address] [place] [address]  : Tristen Gant MD, PhD  CLIA Number: [id number]   Cryptosporidium Antigen, Stool   Result Value Ref Range    Cryptosporidium Antigen by EIA Negative Negative      Comment:      Performed By: [facility]  [address] [place] [address]  : Tristen Gant MD, PhD  CLIA Number: [id number]   CBC and Auto Differential   Result Value Ref Range    WBC 8.8 4.4 - 11.3 x10*3/uL    nRBC 0.0 0.0 - 0.0 /100 WBCs    RBC 4.65 4.00 - 5.20 x10*6/uL    Hemoglobin 13.7 12.0 - 16.0 g/dL    Hematocrit 43.1 36.0 - 46.0 %    MCV 93 80 - 100 fL    MCH 29.5 26.0 - 34.0 pg    MCHC 31.8 (L) 32.0 - 36.0 g/dL    RDW 14.4 11.5 - 14.5 %    Platelets 244 150 - 450 x10*3/uL    Neutrophils % 65.0 40.0 - 80.0 %    Immature Granulocytes %, Automated 0.2 0.0 - 0.9 %      Comment:      Immature Granulocyte Count (IG) includes promyelocytes, myelocytes and metamyelocytes but does not include bands. Percent differential counts (%) should be interpreted in the context of the absolute cell counts (cells/UL).    Lymphocytes % 22.2 13.0 - 44.0 %    Monocytes % 8.6 2.0 - 10.0 %    Eosinophils % 3.3 0.0 - 6.0 %    Basophils % 0.7 0.0 - 2.0 %    Neutrophils  Absolute 5.71 1.20 - 7.70 x10*3/uL      Comment:      Percent differential counts (%) should be interpreted in the context of the absolute cell counts (cells/uL).    Immature Granulocytes Absolute, Automated 0.02 0.00 - 0.70 x10*3/uL    Lymphocytes Absolute 1.95 1.20 - 4.80 x10*3/uL    Monocytes Absolute 0.76 0.10 - 1.00 x10*3/uL    Eosinophils Absolute 0.29 0.00 - 0.70 x10*3/uL    Basophils Absolute 0.06 0.00 - 0.10 x10*3/uL   Comprehensive Metabolic Panel   Result Value Ref Range    Glucose 107 (H) 74 - 99 mg/dL    Sodium 145 136 - 145 mmol/L    Potassium 5.0 3.5 - 5.3 mmol/L    Chloride 102 98 - 107 mmol/L    Bicarbonate 30 21 - 32 mmol/L    Anion Gap 18 10 - 20 mmol/L    Urea Nitrogen 11 6 - 23 mg/dL    Creatinine 0.95 0.50 - 1.05 mg/dL    eGFR 69 >60 mL/min/1.73m*2      Comment:      Calculations of estimated GFR are performed using the 2021 CKD-EPI Study Refit equation without the race variable for the IDMS-Traceable creatinine methods.  https://jasn.asnjournals.org/content/early/2021/09/22/ASN.0199005068    Calcium 8.8 8.6 - 10.3 mg/dL    Albumin 4.0 3.4 - 5.0 g/dL    Alkaline Phosphatase 78 33 - 110 U/L    Total Protein 6.3 (L) 6.4 - 8.2 g/dL    AST 12 9 - 39 U/L    Bilirubin, Total 0.3 0.0 - 1.2 mg/dL    ALT 10 7 - 45 U/L      Comment:      Patients treated with Sulfasalazine may generate falsely decreased results for ALT.       9:17 AM  Yin Hamm MD  [facility] [place] [address] Clinical Support Staff  The rest of the parasite screens were negative but she had a positive calprotectin which is an inflammatory marker of the stool: Recommend see GI she likely needs a colonoscopy to look for microscopic colitis, referral inputted, take probiotics can use Pepto-Bismol in the meantime    LVM. CA

## 2025-01-06 NOTE — TELEPHONE ENCOUNTER
----- Message from Yin Hamm sent at 1/6/2025  8:55 AM EST -----  The rest of the parasite screens were negative but she had a positive calprotectin which is an inflammatory marker of the stool: Recommend see GI she likely needs a colonoscopy to look for microscopic colitis, referral inputted, take probiotics can use Pepto-Bismol in the meantime

## 2025-02-10 ENCOUNTER — OFFICE VISIT (OUTPATIENT)
Facility: CLINIC | Age: 60
End: 2025-02-10
Payer: COMMERCIAL

## 2025-02-10 ENCOUNTER — APPOINTMENT (OUTPATIENT)
Facility: CLINIC | Age: 60
End: 2025-02-10
Payer: COMMERCIAL

## 2025-02-10 VITALS — WEIGHT: 290 LBS | HEART RATE: 87 BPM | BODY MASS INDEX: 45.52 KG/M2 | HEIGHT: 67 IN

## 2025-02-10 DIAGNOSIS — Z12.11 COLON CANCER SCREENING: ICD-10-CM

## 2025-02-10 DIAGNOSIS — Z12.11 COLON CANCER SCREENING: Primary | ICD-10-CM

## 2025-02-10 DIAGNOSIS — R19.7 DIARRHEA, UNSPECIFIED TYPE: ICD-10-CM

## 2025-02-10 PROCEDURE — 3008F BODY MASS INDEX DOCD: CPT | Performed by: INTERNAL MEDICINE

## 2025-02-10 PROCEDURE — 99204 OFFICE O/P NEW MOD 45 MIN: CPT | Performed by: INTERNAL MEDICINE

## 2025-02-10 RX ORDER — SODIUM, POTASSIUM,MAG SULFATES 17.5-3.13G
SOLUTION, RECONSTITUTED, ORAL ORAL
Qty: 1 EACH | Refills: 0 | Status: SHIPPED | OUTPATIENT
Start: 2025-02-10

## 2025-02-10 NOTE — PROGRESS NOTES
Primary Care Provider: Yin Hamm MD  Referring Provider: Yin Hamm MD  My final recommendations will be communicated back to the referring physician and/or primary care provider via shared medical records or via US mail.    Chief Complaint (Reason for visit):   Rashida Whitfield is a 59 y.o. female who presents for diarrhea    ASSESSMENT AND PLAN     Assessment & Plan  Colon cancer screening  Patient never had a colonoscopy before.  She states that she had a Cologuard done    -colonoscopy  Orders:    Colonoscopy Screening; Average Risk Patient; Future    Diarrhea, unspecified type  Patient has been having diarrhea since mid to end December.  It is a delay Jamee getting better.     Fecal calprotectin was 783  Stool tests were negative    I explained to the patient that 90% of acute diarrhea are infectious in origin and most are self-limited    -Patient needs a colonoscopy anyways  -At some point I will repeat a fecal calprotectin, based on patient's symptoms  Orders:    Referral to Gastroenterology    Colonoscopy Screening; Average Risk Patient; Future      I discussed the risks, benefits and alternative(s) of the procedure(s) with the patient. Pt verbalizes understanding and is willing to proceed. All questions were answered.    Bruce Mcallister MD, MS    SUBJECTIVE   HPI: History obtained from patient    59-year-old female who comes to see me for diarrhea which has been going on since mid-and December.     At baseline patient has 1 solid BM per day  In December, she used to have 4-5 loose watery bowel movements  Now she has 1-2, soft mushy bowel movements.  She has less urgency and no accidents now    Fecal calprotectin was 783  Stool tests were negative    Patient used to take high amount of NSAIDs.  She has cut down on ibuprofen/Advil/Aleve.  She takes oral Voltaren as necessary now.  She has also started taking probiotics    Past Medical History:   Diagnosis Date    Acute maxillary sinusitis,  unspecified 11/30/2020    Acute maxillary sinusitis    Acute serous otitis media, right ear 03/01/2019    Acute serous otitis media of right ear    Anxiety disorder, unspecified 09/09/2016    Anxiety    Anxiety disorder, unspecified 07/14/2014    Anxiety    Body mass index (BMI) 45.0-49.9, adult (Multi) 03/17/2021    BMI 45.0-49.9, adult    Contact with and (suspected) exposure to covid-19 11/30/2020    Suspected COVID-19 virus infection    Diarrhea 09/05/2024    Heart murmur     Inappropriate diet and eating habits 08/09/2018    Inappropriate diet and eating habits    Lateral epicondylitis, right elbow 05/21/2015    Lateral epicondylitis of right elbow    Menopausal and female climacteric states 03/02/2017    Menopausal symptoms    Other enthesopathy of left foot and ankle 09/13/2016    Left ankle tendonitis    Other general symptoms and signs 02/22/2016    Flu-like symptoms    Pain in left ankle and joints of left foot 09/13/2016    Left ankle pain    Pain in unspecified ankle and joints of unspecified foot 09/08/2016    Ankle pain    Personal history of diseases of the skin and subcutaneous tissue     History of guttate psoriasis    Personal history of diseases of the skin and subcutaneous tissue 03/01/2019    History of guttate psoriasis    Personal history of other diseases of the female genital tract 11/02/2015    History of dysfunctional uterine bleeding    Personal history of other diseases of the musculoskeletal system and connective tissue 07/10/2014    History of low back pain    Personal history of other diseases of the nervous system and sense organs     History of obstructive sleep apnea    Personal history of other diseases of the respiratory system 03/27/2017    History of acute sinusitis    Personal history of other drug therapy 09/24/2020    History of influenza vaccination    Personal history of other mental and behavioral disorders 03/12/2020    History of anxiety disorder    Personal history of  "other specified conditions 2020    History of insomnia    Personal history of other specified conditions 2021    History of headache    Personal history of other specified conditions 2021    History of urinary frequency    Personal history of pneumonia (recurrent)     History of pneumonia    Sleep apnea     Strain of left Achilles tendon, initial encounter 2017    Partial tear of left Achilles tendon       Past Surgical History:   Procedure Laterality Date     SECTION, CLASSIC  2015     Section    OTHER SURGICAL HISTORY  2015    Treatment Of Ankle Fracture    OTHER SURGICAL HISTORY  2022    Hand surgery       Current Outpatient Medications   Medication Sig Dispense Refill    aspirin-acetaminophen-caffeine (Excedrin Migraine) 250-250-65 mg tablet Take 1 tablet by mouth every 6 hours if needed for headaches.      atorvastatin (Lipitor) 20 mg tablet Take 1 tablet (20 mg) by mouth once daily. 90 tablet 3    diclofenac (Voltaren) 75 mg EC tablet Take 1 tablet (75 mg) by mouth 2 times a day as needed (pain). Do not crush, chew, or split. 180 tablet 0    ibuprofen 200 mg tablet Take 1 tablet (200 mg) by mouth every 6 hours.      multivitamin tablet Take 1 tablet by mouth once daily.      omeprazole (PriLOSEC) 20 mg DR capsule Take by mouth once daily. Unsure of dose.OTC (Patient not taking: Reported on 2/10/2025)       No current facility-administered medications for this visit.       No Known Allergies  OBJECTIVE   PHYSICAL EXAM:  Pulse 87   Ht 1.702 m (5' 7\")   Wt 132 kg (290 lb)   BMI 45.42 kg/m²      LABS/IMAGING/SCOPES  Lab Results   Component Value Date    WBC 8.8 2024    HGB 13.7 2024    HCT 43.1 2024    MCV 93 2024     2024     Lab Results   Component Value Date    GLUCOSE 107 (H) 2024    CALCIUM 8.8 2024     2024    K 5.0 2024    CO2 30 2024     2024    BUN 11 2024 "    CREATININE 0.95 12/26/2024     Lab Results   Component Value Date    ALT 10 12/26/2024    AST 12 12/26/2024    ALKPHOS 78 12/26/2024    BILITOT 0.3 12/26/2024       === 09/19/24 ===    CT LUNG SCREENING LOW DOSE    - Impression -  1. 3 mm nodules in the left lower lobe as described above and  stable.. Continued screening with low-dose noncontrast chest CT in 12  months (from current date) is recommended.  2. Mild upper lung predominant emphysema.  3. Estimated coronary artery calcium score is 209* which correlates  with at least 95th percentile rank as compared to matched CESPEDES-study  subjects(https://www.cespedes-nhlbi.org/Calcium/input.aspx).  4. Additional stable findings as described above    LUNG RADS CATEGORY:  Lung Rad: Lung-RADS 2 (Benign Appearance or Indolent Behavior)    Recommendation: Continue annual screening with Low Dose Chest CT in  12 months, recommended as per American College of Radiology  Guidelines Lung-RADS Version 2022.        **The patient's CAC score was measured with an FDA-cleared AI tool  that correlates well with traditional methods. However, due to the  non-gated CT scan and new algorithm, AI-powered scores should not  replace traditional cardiovascular risk assessment. For further  assistance, refer to the [facility] Cardiovascular Prevention  Program via an [facility] referral to 'Cardiology Prevention Program.'    MACRO:  None    Signed by: Brianna Norris 9/19/2024 10:10 PM  Dictation workstation:   [id number]    Bruce Mcallister MD, MS

## 2025-02-10 NOTE — LETTER
February 10, 2025     Yin Hamm MD  [address]  [address]  [facility] [address]    Patient: Rashida Whitfield   YOB: 1965   Date of Visit: 2/10/2025       Dear Dr. Yin Hamm MD:    Thank you for referring Rashida Whitfield to me for evaluation. Below are my notes for this consultation.  If you have questions, please do not hesitate to call me. I look forward to following your patient along with you.       Sincerely,     Bruce Mcallister MD, MS      CC: No Recipients  ______________________________________________________________________________________    Primary Care Provider: Yin Hamm MD  Referring Provider: Yin Hamm MD  My final recommendations will be communicated back to the referring physician and/or primary care provider via shared medical records or via US mail.    Chief Complaint (Reason for visit):   Rashida Whitfield is a 59 y.o. female who presents for diarrhea    ASSESSMENT AND PLAN     Assessment & Plan  Colon cancer screening  Patient never had a colonoscopy before.  She states that she had a Cologuard done    -colonoscopy  Orders:  •  Colonoscopy Screening; Average Risk Patient; Future    Diarrhea, unspecified type  Patient has been having diarrhea since mid to end December.  It is a delay Jamee getting better.     Fecal calprotectin was 783  Stool tests were negative    I explained to the patient that 90% of acute diarrhea are infectious in origin and most are self-limited    -Patient needs a colonoscopy anyways  -At some point I will repeat a fecal calprotectin, based on patient's symptoms  Orders:  •  Referral to Gastroenterology  •  Colonoscopy Screening; Average Risk Patient; Future      I discussed the risks, benefits and alternative(s) of the procedure(s) with the patient. Pt verbalizes understanding and is willing to proceed. All questions were answered.    Bruce Mcallister MD, MS    SUBJECTIVE   HPI: History obtained from patient    59-year-old female who  comes to see me for diarrhea which has been going on since mid-and December.     At baseline patient has 1 solid BM per day  In December, she used to have 4-5 loose watery bowel movements  Now she has 1-2, soft mushy bowel movements.  She has less urgency and no accidents now    Fecal calprotectin was 783  Stool tests were negative    Past Medical History:   Diagnosis Date   • Acute maxillary sinusitis, unspecified 11/30/2020    Acute maxillary sinusitis   • Acute serous otitis media, right ear 03/01/2019    Acute serous otitis media of right ear   • Anxiety disorder, unspecified 09/09/2016    Anxiety   • Anxiety disorder, unspecified 07/14/2014    Anxiety   • Body mass index (BMI) 45.0-49.9, adult (Multi) 03/17/2021    BMI 45.0-49.9, adult   • Contact with and (suspected) exposure to covid-19 11/30/2020    Suspected COVID-19 virus infection   • Diarrhea 09/05/2024   • Heart murmur    • Inappropriate diet and eating habits 08/09/2018    Inappropriate diet and eating habits   • Lateral epicondylitis, right elbow 05/21/2015    Lateral epicondylitis of right elbow   • Menopausal and female climacteric states 03/02/2017    Menopausal symptoms   • Other enthesopathy of left foot and ankle 09/13/2016    Left ankle tendonitis   • Other general symptoms and signs 02/22/2016    Flu-like symptoms   • Pain in left ankle and joints of left foot 09/13/2016    Left ankle pain   • Pain in unspecified ankle and joints of unspecified foot 09/08/2016    Ankle pain   • Personal history of diseases of the skin and subcutaneous tissue     History of guttate psoriasis   • Personal history of diseases of the skin and subcutaneous tissue 03/01/2019    History of guttate psoriasis   • Personal history of other diseases of the female genital tract 11/02/2015    History of dysfunctional uterine bleeding   • Personal history of other diseases of the musculoskeletal system and connective tissue 07/10/2014    History of low back pain   •  Personal history of other diseases of the nervous system and sense organs     History of obstructive sleep apnea   • Personal history of other diseases of the respiratory system 2017    History of acute sinusitis   • Personal history of other drug therapy 2020    History of influenza vaccination   • Personal history of other mental and behavioral disorders 2020    History of anxiety disorder   • Personal history of other specified conditions 2020    History of insomnia   • Personal history of other specified conditions 2021    History of headache   • Personal history of other specified conditions 2021    History of urinary frequency   • Personal history of pneumonia (recurrent)     History of pneumonia   • Sleep apnea    • Strain of left Achilles tendon, initial encounter 2017    Partial tear of left Achilles tendon       Past Surgical History:   Procedure Laterality Date   •  SECTION, CLASSIC  2015     Section   • OTHER SURGICAL HISTORY  2015    Treatment Of Ankle Fracture   • OTHER SURGICAL HISTORY  2022    Hand surgery       Current Outpatient Medications   Medication Sig Dispense Refill   • aspirin-acetaminophen-caffeine (Excedrin Migraine) 250-250-65 mg tablet Take 1 tablet by mouth every 6 hours if needed for headaches.     • atorvastatin (Lipitor) 20 mg tablet Take 1 tablet (20 mg) by mouth once daily. 90 tablet 3   • diclofenac (Voltaren) 75 mg EC tablet Take 1 tablet (75 mg) by mouth 2 times a day as needed (pain). Do not crush, chew, or split. 180 tablet 0   • ibuprofen 200 mg tablet Take 1 tablet (200 mg) by mouth every 6 hours.     • multivitamin tablet Take 1 tablet by mouth once daily.     • omeprazole (PriLOSEC) 20 mg DR capsule Take by mouth once daily. Unsure of dose.OTC (Patient not taking: Reported on 2/10/2025)       No current facility-administered medications for this visit.       No Known Allergies  OBJECTIVE  "  PHYSICAL EXAM:  Pulse 87   Ht 1.702 m (5' 7\")   Wt 132 kg (290 lb)   BMI 45.42 kg/m²      LABS/IMAGING/SCOPES  Lab Results   Component Value Date    WBC 8.8 12/26/2024    HGB 13.7 12/26/2024    HCT 43.1 12/26/2024    MCV 93 12/26/2024     12/26/2024     Lab Results   Component Value Date    GLUCOSE 107 (H) 12/26/2024    CALCIUM 8.8 12/26/2024     12/26/2024    K 5.0 12/26/2024    CO2 30 12/26/2024     12/26/2024    BUN 11 12/26/2024    CREATININE 0.95 12/26/2024     Lab Results   Component Value Date    ALT 10 12/26/2024    AST 12 12/26/2024    ALKPHOS 78 12/26/2024    BILITOT 0.3 12/26/2024       === 09/19/24 ===    CT LUNG SCREENING LOW DOSE    - Impression -  1. 3 mm nodules in the left lower lobe as described above and  stable.. Continued screening with low-dose noncontrast chest CT in 12  months (from current date) is recommended.  2. Mild upper lung predominant emphysema.  3. Estimated coronary artery calcium score is 209* which correlates  with at least 95th percentile rank as compared to matched CESPEDES-study  subjects(https://www.cespedes-nhlbi.org/Calcium/input.aspx).  4. Additional stable findings as described above    LUNG RADS CATEGORY:  Lung Rad: Lung-RADS 2 (Benign Appearance or Indolent Behavior)    Recommendation: Continue annual screening with Low Dose Chest CT in  12 months, recommended as per American College of Radiology  Guidelines Lung-RADS Version 2022.        **The patient's CAC score was measured with an FDA-cleared AI tool  that correlates well with traditional methods. However, due to the  non-gated CT scan and new algorithm, AI-powered scores should not  replace traditional cardiovascular risk assessment. For further  assistance, refer to the [facility] [place] [facility] Cardiovascular Prevention  Program via an [facility] referral to 'Cardiology Prevention Program.'    MACRO:  None    Signed by: Brianna Norris 9/19/2024 10:10 PM  Dictation workstation:   [id number]    Bruce Mcallister, " MD, MS

## 2025-03-06 ENCOUNTER — OFFICE VISIT (OUTPATIENT)
Dept: GASTROENTEROLOGY | Facility: EXTERNAL LOCATION | Age: 60
End: 2025-03-06
Payer: COMMERCIAL

## 2025-03-06 ENCOUNTER — APPOINTMENT (OUTPATIENT)
Dept: PRIMARY CARE | Facility: CLINIC | Age: 60
End: 2025-03-06
Payer: COMMERCIAL

## 2025-03-06 DIAGNOSIS — Z12.11 COLON CANCER SCREENING: Primary | ICD-10-CM

## 2025-03-06 DIAGNOSIS — D12.8 BENIGN NEOPLASM OF RECTUM: ICD-10-CM

## 2025-03-06 DIAGNOSIS — R19.7 DIARRHEA, UNSPECIFIED TYPE: ICD-10-CM

## 2025-03-06 DIAGNOSIS — K52.9 NONINFECTIOUS GASTROENTERITIS, UNSPECIFIED TYPE: ICD-10-CM

## 2025-03-06 PROCEDURE — 45385 COLONOSCOPY W/LESION REMOVAL: CPT | Performed by: INTERNAL MEDICINE

## 2025-03-06 PROCEDURE — 88305 TISSUE EXAM BY PATHOLOGIST: CPT | Performed by: PATHOLOGY

## 2025-03-06 PROCEDURE — 88305 TISSUE EXAM BY PATHOLOGIST: CPT

## 2025-03-06 PROCEDURE — 45380 COLONOSCOPY AND BIOPSY: CPT | Performed by: INTERNAL MEDICINE

## 2025-03-07 ENCOUNTER — LAB REQUISITION (OUTPATIENT)
Dept: LAB | Facility: HOSPITAL | Age: 60
End: 2025-03-07
Payer: COMMERCIAL

## 2025-03-10 ENCOUNTER — APPOINTMENT (OUTPATIENT)
Facility: CLINIC | Age: 60
End: 2025-03-10
Payer: COMMERCIAL

## 2025-03-10 ENCOUNTER — OFFICE VISIT (OUTPATIENT)
Dept: PRIMARY CARE | Facility: CLINIC | Age: 60
End: 2025-03-10
Payer: COMMERCIAL

## 2025-03-10 VITALS
WEIGHT: 277 LBS | BODY MASS INDEX: 43.47 KG/M2 | DIASTOLIC BLOOD PRESSURE: 80 MMHG | HEART RATE: 89 BPM | TEMPERATURE: 98.5 F | OXYGEN SATURATION: 98 % | SYSTOLIC BLOOD PRESSURE: 118 MMHG | HEIGHT: 67 IN

## 2025-03-10 DIAGNOSIS — Z72.0 TOBACCO ABUSE: ICD-10-CM

## 2025-03-10 DIAGNOSIS — I25.10 ATHEROSCLEROSIS OF NATIVE CORONARY ARTERY OF NATIVE HEART WITHOUT ANGINA PECTORIS: ICD-10-CM

## 2025-03-10 DIAGNOSIS — G47.33 OBSTRUCTIVE SLEEP APNEA: ICD-10-CM

## 2025-03-10 DIAGNOSIS — F41.9 ANXIETY: ICD-10-CM

## 2025-03-10 DIAGNOSIS — Z00.00 ROUTINE GENERAL MEDICAL EXAMINATION AT A HEALTH CARE FACILITY: Primary | ICD-10-CM

## 2025-03-10 DIAGNOSIS — R73.9 HYPERGLYCEMIA: ICD-10-CM

## 2025-03-10 DIAGNOSIS — E66.01 MORBID OBESITY (MULTI): ICD-10-CM

## 2025-03-10 DIAGNOSIS — E78.2 HYPERLIPEMIA, MIXED: ICD-10-CM

## 2025-03-10 PROCEDURE — 99407 BEHAV CHNG SMOKING > 10 MIN: CPT | Performed by: FAMILY MEDICINE

## 2025-03-10 PROCEDURE — 4004F PT TOBACCO SCREEN RCVD TLK: CPT | Performed by: FAMILY MEDICINE

## 2025-03-10 PROCEDURE — 99396 PREV VISIT EST AGE 40-64: CPT | Performed by: FAMILY MEDICINE

## 2025-03-10 PROCEDURE — 3008F BODY MASS INDEX DOCD: CPT | Performed by: FAMILY MEDICINE

## 2025-03-10 RX ORDER — BUPROPION HYDROCHLORIDE 75 MG/1
75 TABLET ORAL 2 TIMES DAILY
Qty: 60 TABLET | Refills: 3 | Status: SHIPPED | OUTPATIENT
Start: 2025-03-10 | End: 2025-07-08

## 2025-03-10 ASSESSMENT — ENCOUNTER SYMPTOMS: DYSPHORIC MOOD: 0

## 2025-03-10 NOTE — PATIENT INSTRUCTIONS
Get your blood work as ordered.  You should hear from our office with results whether they are normal are not within a few days.  Please call the office if you do not hear from us.      Up-to-date on mammogram      Follow-up with GI pending lab results    Anxiety :  For your anxiety is important that you do activities that contribute to relaxation.  Regular exercise and adequate sleep are very important.  Counseling can be beneficial as well.  If you are  on medications for anxiety is important that you take them as directed and let your physician know if you continue to have symptoms or if your symptoms worsen.  It is also important that you be seen in the office on a regular basis at least every 6 months.      Quit Smoking   It is very important that you quit smoking.  Even if you have smoked for a long time there are still many physical benefits to stopping smoking.  There are also numerous conditions that are worsened by smoking such as cancer, lung disease, emphysema, and heart disease to name a few.  Pick a date to quit.  On your quit date, get rid of everything that has to do with smoking.   If you were given a medication to quit smoking, such as Chantix or Wellbutrin it is recommended to start the week prior to quitting.  If you are using nicotine replacement to quit smoking, it is very important you do not smoke while you have a patch on or if using nicotine gum.  That can give you a toxic amount of nicotine in your system and can be dangerous to your heart.  Avoid social settings where you may be around smokers.  Get your family and friends' support.  Call the [place] Tobacco Quit Line at 1-292.414.3125 for help and guidance.  You can quit smoking if you really want to.

## 2025-03-10 NOTE — PROGRESS NOTES
Subjective   Patient ID: Rashida Whitfield is a 59 y.o. female who presents for Annual Exam. Underwent a colonoscopy recently; still having diarrhea. Anxiety has increased.        Had colonoscopy   Had biopsies done   Still with diarrhea   Possibly microscopic colitis     Anxiety :    Feels an anxious / pressure   But stress level is not bad     Occ trouble sleeping         Walking routinely   \  UTD pap      ROS :  ( No or Yes )  Any eye problems:    N  Frequent nasal congestion or sneezing:  N  Difficulty hearing:  N  Ear problems:   N  Asthma or wheezing:   N  Frequent cough:   N  Shortness of breath:N  Hemoptysis: N  Hx of TB: N  High blood pressure: N  Heart disease: N  Heart murmur:y  Chest pain or pressure with exertion:N  Leg pains with walking up hill: N  Fast heartbeat or palpitations:N  Varicose veins: N  Difficulty swallowing foods or liquids: N  Abdominal pains: N  Frequent indigestion or heartburn: y  Constipation: N  Diarrhea or loose stools: y  Weight changes recently: y  Change in bowel movements: y  An ulcer: N  Black stools: N  Jaundice, hepatitis or liver problems: N  Gallstones or gallbladder problems: N  Stomach or intestinal problems: N  Vomited blood : N  Blood in bowel movements: N  Sickle cell trait  or Anemia: N  Been refused as a blood donor: N  Problems with her kidney, bladder, or prostate: N  Loss of control of your urine: y  Pain or burning with urination: N  Blood in her urine: N  Trouble starting flow of urine: N  Frequent urination at night: y  History of venereal disease: N  Any skin problems: y  Diabetes: N  Thyroid disease: N  Frequent back pain: N  Pain or swelling around joints: Y  Broken any bones: y  Frequent headaches: N  Dizziness: N  Have you ever had Seizures or convulsions: N  Have you ever temporarily lost control of your hand or foot : N   Had a stroke or been paralyzed : N  Temporarily lost your ability to speak: N  Fainted or lost consciousness: N  Hallucinations:  "N  Nervousness: N  Do you take medications for your nerves: y  Trouble falling asleep or staying asleep: y  Do you feel tired even after a good night sleep: y  Do you feel down in the dumps or depressed: N  Frequent crying: N  Using alcohol excessively: N  Any street drug use : N  Do have any other medical problems that are concerns :     Review of Systems   Psychiatric/Behavioral:  Negative for dysphoric mood.         Some anxious        Objective   /80   Pulse 89   Temp 36.9 °C (98.5 °F)   Ht 1.702 m (5' 7\")   Wt 126 kg (277 lb)   SpO2 98%   BMI 43.38 kg/m²     Physical Exam  Constitutional:       General: She is not in acute distress.     Appearance: Normal appearance.   HENT:      Head: Normocephalic and atraumatic.      Right Ear: Tympanic membrane, ear canal and external ear normal.      Left Ear: Tympanic membrane, ear canal and external ear normal.      Nose: Nose normal.      Mouth/Throat:      Mouth: Mucous membranes are moist.      Pharynx: No oropharyngeal exudate or posterior oropharyngeal erythema.   Eyes:      Extraocular Movements: Extraocular movements intact.      Conjunctiva/sclera: Conjunctivae normal.      Pupils: Pupils are equal, round, and reactive to light.   Cardiovascular:      Rate and Rhythm: Normal rate and regular rhythm.      Heart sounds: No murmur heard.  Pulmonary:      Effort: Pulmonary effort is normal.      Breath sounds: Normal breath sounds.   Chest:   Breasts:     Left: Normal.   Abdominal:      General: Bowel sounds are normal.      Palpations: Abdomen is soft.   Musculoskeletal:         General: Normal range of motion.      Cervical back: No rigidity.   Lymphadenopathy:      Cervical: No cervical adenopathy.   Skin:     General: Skin is warm and dry.      Findings: No rash.   Neurological:      General: No focal deficit present.      Mental Status: She is alert and oriented to person, place, and time.      Cranial Nerves: No cranial nerve deficit.      Gait: " Gait normal.   Psychiatric:         Mood and Affect: Mood normal.         Behavior: Behavior normal.         Assessment/Plan   Problem List Items Addressed This Visit             ICD-10-CM    Coronary atherosclerosis I25.10    Relevant Orders    Comprehensive Metabolic Panel    CBC and Auto Differential    Hemoglobin A1C    Lipid Panel    Hyperlipemia, mixed E78.2    Relevant Orders    Comprehensive Metabolic Panel    CBC and Auto Differential    Hemoglobin A1C    Lipid Panel    Obstructive sleep apnea G47.33    Relevant Orders    Comprehensive Metabolic Panel    CBC and Auto Differential    Hemoglobin A1C    Lipid Panel    Hyperglycemia R73.9    Relevant Orders    Comprehensive Metabolic Panel    CBC and Auto Differential    Hemoglobin A1C    Lipid Panel     Other Visit Diagnoses         Codes    Routine general medical examination at a health care facility    -  Primary Z00.00    Relevant Orders    Comprehensive Metabolic Panel    CBC and Auto Differential    Hemoglobin A1C    Lipid Panel    Anxiety     F41.9    Morbid obesity (Multi)     E66.01    Tobacco abuse     Z72.0    Relevant Medications    buPROPion (Wellbutrin) 75 mg tablet

## 2025-03-11 DIAGNOSIS — M25.59 PAIN IN OTHER JOINT: ICD-10-CM

## 2025-03-11 DIAGNOSIS — M25.561 CHRONIC PAIN OF BOTH KNEES: ICD-10-CM

## 2025-03-11 DIAGNOSIS — M25.562 CHRONIC PAIN OF BOTH KNEES: ICD-10-CM

## 2025-03-11 DIAGNOSIS — G89.29 CHRONIC PAIN OF BOTH KNEES: ICD-10-CM

## 2025-03-11 RX ORDER — DICLOFENAC SODIUM 75 MG/1
TABLET, DELAYED RELEASE ORAL
Qty: 180 TABLET | Refills: 1 | Status: SHIPPED | OUTPATIENT
Start: 2025-03-11

## 2025-03-14 LAB
LABORATORY COMMENT REPORT: NORMAL
PATH REPORT.FINAL DX SPEC: NORMAL
PATH REPORT.GROSS SPEC: NORMAL
PATH REPORT.RELEVANT HX SPEC: NORMAL
PATH REPORT.TOTAL CANCER: NORMAL

## 2025-03-18 DIAGNOSIS — K52.832 LYMPHOCYTIC COLITIS: Primary | ICD-10-CM

## 2025-03-18 RX ORDER — BUDESONIDE 3 MG/1
CAPSULE, COATED PELLETS ORAL
Qty: 180 CAPSULE | Refills: 0 | Status: SHIPPED | OUTPATIENT
Start: 2025-03-18

## 2025-04-03 ENCOUNTER — HOSPITAL ENCOUNTER (OUTPATIENT)
Dept: RADIOLOGY | Facility: CLINIC | Age: 60
Discharge: HOME | End: 2025-04-03
Payer: COMMERCIAL

## 2025-04-03 ENCOUNTER — APPOINTMENT (OUTPATIENT)
Dept: ORTHOPEDIC SURGERY | Facility: CLINIC | Age: 60
End: 2025-04-03
Payer: COMMERCIAL

## 2025-04-03 DIAGNOSIS — M25.561 PAIN IN BOTH KNEES, UNSPECIFIED CHRONICITY: ICD-10-CM

## 2025-04-03 DIAGNOSIS — M25.562 PAIN IN BOTH KNEES, UNSPECIFIED CHRONICITY: ICD-10-CM

## 2025-04-03 PROCEDURE — 20610 DRAIN/INJ JOINT/BURSA W/O US: CPT | Performed by: ORTHOPAEDIC SURGERY

## 2025-04-03 PROCEDURE — 99214 OFFICE O/P EST MOD 30 MIN: CPT | Performed by: ORTHOPAEDIC SURGERY

## 2025-04-03 PROCEDURE — 73564 X-RAY EXAM KNEE 4 OR MORE: CPT | Mod: 50

## 2025-04-03 PROCEDURE — 4004F PT TOBACCO SCREEN RCVD TLK: CPT | Performed by: ORTHOPAEDIC SURGERY

## 2025-04-03 RX ORDER — TRIAMCINOLONE ACETONIDE 40 MG/ML
2.5 INJECTION, SUSPENSION INTRA-ARTICULAR; INTRAMUSCULAR
Status: COMPLETED | OUTPATIENT
Start: 2025-04-03 | End: 2025-04-03

## 2025-04-03 RX ADMIN — TRIAMCINOLONE ACETONIDE 2.5 MG: 40 INJECTION, SUSPENSION INTRA-ARTICULAR; INTRAMUSCULAR at 11:14

## 2025-04-03 ASSESSMENT — PAIN SCALES - GENERAL: PAINLEVEL_OUTOF10: 2

## 2025-04-03 ASSESSMENT — PAIN - FUNCTIONAL ASSESSMENT: PAIN_FUNCTIONAL_ASSESSMENT: 0-10

## 2025-04-03 NOTE — PROGRESS NOTES
This is a consultation from Dr. Yin Hamm MD for   Chief Complaint   Patient presents with    Left Knee - Pain    Right Knee - Pain       This is a 59 y.o. female who presents for follow-up for her bilateral knees.  Patient has bilateral knee arthritis, she had cortisone injections last July.  They are very helpful and lasted for nearly 8 months.  Since then she has had return of her symptoms over the last few weeks, exacerbation of her pain.  Sharp pain over the medial knee on both sides left worse than right.  No numbness no tingling no fevers no chills no shooting pain down the leg.  She still able to get around do all her normal activities.    Physical Exam    There has been no interval change in this patient's past medical, surgical, medications, allergies, family history or social history since the most recent visit to a provider within our department. 14 point review of systems was performed, reviewed, and negative except for pertinent positives documented in the history of present illness.     Constitutional: well developed, well nourished female in no acute distress  Psychiatric: normal mood, appropriate affect  Eyes: sclera anicteric  HENT: normocephalic/atraumatic  CV: regular rate and rhythm   Respiratory: non labored breathing  Integumentary: no rash  Neurological: moves all extremities    Left knee exam: skin intact no lacerations or abrations.  1+ effusion.  Tender medial joint line. negative log roll negative patellar grind. ROM 0-120. stable to varus and valgus stress at 0 and 30 degrees. negative lachman negative posterior drawer negative nidhi. 5/5 ehl/fhl/gs/ta. silt s/s/sp/dp/t. 2+ dp/pt        L Inj/Asp: bilateral knee on 4/3/2025 11:14 AM  Indications: pain and joint swelling  Details: 22 G needle, anterolateral approach  Medications (Right): 2.5 mg triamcinolone acetonide 40 mg/mL  Medications (Left): 2.5 mg triamcinolone acetonide 40 mg/mL    Discussion:  I discussed the  conservative treatment options for knee osteoarthritis including but not limited to physical therapy, oral NSAIDS, activity and lifestyle modification, and corticosteroid injections. Pt has elected to undergo a cortisone injection today. I have explained the risk and benefits of an injection including the possibility of joint infection, bleeding, damage to cartilage, allergic reaction. Patient verbalized understanding and gave verbal consent wishes to proceed with a intra-articular cortisone injection for their knee.    Procedure:  After discussing the risk and benefits of the procedure, we proceeded with an intra-articular bilateral knee injection. We discussed the risks and benefits and potential morbidity related to the treatment, and to the prescription medication administered in the injection    With the patient's informed verbal consent, the bilateral knees were prepped in standard sterile fashion with Chlorhexidine. The skin was then anesthetized with ethyl chloride spray and cleaned again with Chlorhexidine. The bilateral knees were then apirated/injected with a prefilled 20-gauge syringe of 40 mg Kenalog + 4 ml Lidocaine using the lateral approach without complications.  The patient tolerated this well and felt immediate initial relief of symptoms. A bandaid was applied and the patient ambulated out of the clinic on ther own accord without difficulty. Patient was instructed to avoid physical activity for 24-48 hours to prevent the knees from swelling and may ice the knees as tolerated. Patient should contact the office if any signs of of infection appear: redness, fever, chills, drainage, swelling or warmth to the knees.  Pt understands that the injections can be repeated no sooner than 3 months.      Procedure, treatment alternatives, risks and benefits explained, specific risks discussed. Consent was given by the patient. Immediately prior to procedure a time out was called to verify the correct patient,  "procedure, equipment, support staff and site/side marked as required. Patient was prepped and draped in the usual sterile fashion.             Impression/Plan: This is a 59 y.o. female with bilateral knee arthritis.  I had an in depth discussion with the patient regarding treatment options for arthritis and their relative risks and benefits. We reviewed surgical and nonsurgical option for treatment. Treatments include anti inflammatory medications, physical therapy, weight loss, activity modification, use of assistive devices, injection therapies. We discussed current prescriptions and risks and benefits of continuation of prescription medication as apporpriate. We discussed that arthritis is often progressive over time, an in end stage arthritis surgical interventions can be considered, including arthroplasty. All questions were answered and the patient voiced their understanding.  Doing well with injections continue that I will see her back as needed    BMI Readings from Last 1 Encounters:   03/10/25 43.38 kg/m²      Lab Results   Component Value Date    CREATININE 0.95 12/26/2024     Tobacco Use: High Risk (4/3/2025)    Patient History     Smoking Tobacco Use: Every Day     Smokeless Tobacco Use: Never     Passive Exposure: Not on file      Computed MELD 3.0 unavailable. One or more values for this score either were not found within the given timeframe or did not fit some other criterion.  Computed MELD-Na unavailable. One or more values for this score either were not found within the given timeframe or did not fit some other criterion.       Lab Results   Component Value Date    HGBA1C 6.0 (H) 04/04/2024     No results found for: \"STAPHMRSASCR\"  "

## 2025-05-01 LAB
ALBUMIN SERPL-MCNC: 4.3 G/DL (ref 3.6–5.1)
ALP SERPL-CCNC: 79 U/L (ref 37–153)
ALT SERPL-CCNC: 12 U/L (ref 6–29)
ANION GAP SERPL CALCULATED.4IONS-SCNC: 10 MMOL/L (CALC) (ref 7–17)
AST SERPL-CCNC: 13 U/L (ref 10–35)
BASOPHILS # BLD AUTO: 83 CELLS/UL (ref 0–200)
BASOPHILS NFR BLD AUTO: 1 %
BILIRUB SERPL-MCNC: 0.4 MG/DL (ref 0.2–1.2)
BUN SERPL-MCNC: 13 MG/DL (ref 7–25)
CALCIUM SERPL-MCNC: 9.4 MG/DL (ref 8.6–10.4)
CHLORIDE SERPL-SCNC: 106 MMOL/L (ref 98–110)
CHOLEST SERPL-MCNC: 170 MG/DL
CHOLEST/HDLC SERPL: 3.2 (CALC)
CO2 SERPL-SCNC: 27 MMOL/L (ref 20–32)
CREAT SERPL-MCNC: 0.82 MG/DL (ref 0.5–1.03)
EGFRCR SERPLBLD CKD-EPI 2021: 82 ML/MIN/1.73M2
EOSINOPHIL # BLD AUTO: 282 CELLS/UL (ref 15–500)
EOSINOPHIL NFR BLD AUTO: 3.4 %
ERYTHROCYTE [DISTWIDTH] IN BLOOD BY AUTOMATED COUNT: 14 % (ref 11–15)
EST. AVERAGE GLUCOSE BLD GHB EST-MCNC: 134 MG/DL
EST. AVERAGE GLUCOSE BLD GHB EST-SCNC: 7.4 MMOL/L
GLUCOSE SERPL-MCNC: 108 MG/DL (ref 65–99)
HBA1C MFR BLD: 6.3 %
HCT VFR BLD AUTO: 45.2 % (ref 35–45)
HDLC SERPL-MCNC: 53 MG/DL
HGB BLD-MCNC: 14.7 G/DL (ref 11.7–15.5)
LDLC SERPL CALC-MCNC: 98 MG/DL (CALC)
LYMPHOCYTES # BLD AUTO: 1951 CELLS/UL (ref 850–3900)
LYMPHOCYTES NFR BLD AUTO: 23.5 %
MCH RBC QN AUTO: 30.1 PG (ref 27–33)
MCHC RBC AUTO-ENTMCNC: 32.5 G/DL (ref 32–36)
MCV RBC AUTO: 92.4 FL (ref 80–100)
MONOCYTES # BLD AUTO: 573 CELLS/UL (ref 200–950)
MONOCYTES NFR BLD AUTO: 6.9 %
NEUTROPHILS # BLD AUTO: 5412 CELLS/UL (ref 1500–7800)
NEUTROPHILS NFR BLD AUTO: 65.2 %
NONHDLC SERPL-MCNC: 117 MG/DL (CALC)
PLATELET # BLD AUTO: 252 THOUSAND/UL (ref 140–400)
PMV BLD REES-ECKER: 11.1 FL (ref 7.5–12.5)
POTASSIUM SERPL-SCNC: 5.1 MMOL/L (ref 3.5–5.3)
PROT SERPL-MCNC: 6.6 G/DL (ref 6.1–8.1)
RBC # BLD AUTO: 4.89 MILLION/UL (ref 3.8–5.1)
SODIUM SERPL-SCNC: 143 MMOL/L (ref 135–146)
TRIGL SERPL-MCNC: 94 MG/DL
WBC # BLD AUTO: 8.3 THOUSAND/UL (ref 3.8–10.8)

## 2025-05-05 ENCOUNTER — TELEPHONE (OUTPATIENT)
Dept: PRIMARY CARE | Facility: CLINIC | Age: 60
End: 2025-05-05

## 2025-05-05 NOTE — TELEPHONE ENCOUNTER
PT stated that she is on the 21st day of buPropion. Mentally, it is working very well. Physically she is stating she is having extreme joint pain waist down. She did not take it today and is inquiring about alternative.

## 2025-05-13 ENCOUNTER — APPOINTMENT (OUTPATIENT)
Dept: PRIMARY CARE | Facility: CLINIC | Age: 60
End: 2025-05-13
Payer: COMMERCIAL

## 2025-05-13 VITALS
BODY MASS INDEX: 43.47 KG/M2 | DIASTOLIC BLOOD PRESSURE: 74 MMHG | TEMPERATURE: 97.7 F | OXYGEN SATURATION: 95 % | SYSTOLIC BLOOD PRESSURE: 116 MMHG | WEIGHT: 277 LBS | HEART RATE: 73 BPM | HEIGHT: 67 IN

## 2025-05-13 DIAGNOSIS — M79.10 MYALGIA: Primary | ICD-10-CM

## 2025-05-13 DIAGNOSIS — F32.0 CURRENT MILD EPISODE OF MAJOR DEPRESSIVE DISORDER WITHOUT PRIOR EPISODE: ICD-10-CM

## 2025-05-13 PROBLEM — Z72.0 TOBACCO ABUSE: Status: ACTIVE | Noted: 2025-05-13

## 2025-05-13 PROCEDURE — 99213 OFFICE O/P EST LOW 20 MIN: CPT | Performed by: FAMILY MEDICINE

## 2025-05-13 PROCEDURE — 3008F BODY MASS INDEX DOCD: CPT | Performed by: FAMILY MEDICINE

## 2025-05-13 PROCEDURE — 4004F PT TOBACCO SCREEN RCVD TLK: CPT | Performed by: FAMILY MEDICINE

## 2025-05-13 ASSESSMENT — PATIENT HEALTH QUESTIONNAIRE - PHQ9
2. FEELING DOWN, DEPRESSED OR HOPELESS: NOT AT ALL
1. LITTLE INTEREST OR PLEASURE IN DOING THINGS: NOT AT ALL
SUM OF ALL RESPONSES TO PHQ9 QUESTIONS 1 AND 2: 0

## 2025-05-13 NOTE — PROGRESS NOTES
"Subjective   Patient ID: Rashida Whitfield is a 59 y.o. female who presents for discuss Wellbutrin and side effects. States she is on about her second month into the Wellbutrin ans she has had really good results mentally but fears the increased blood sugars and diffuse joint and muscle pains. Pt spoke to her pharmacist about this drug and she decided to come in for the discussion. Lastly, pt is on Budesonide from Dr. Mcallister for her gut inflammation but this drug has a noted side effect of hyperglycemia as well; will be on this short- term.         Started wellbutrin  Mood is good   Getting more motivated   Originally took for smoking sensation     2-3 weeks into it :  started having more joint pains   Hips , shoulders , knees toes  Lower back   Last 3 weeks , no swelling   Previously taking voltaren once per week   Does help some       Prozac, zoloft , buspar     Hyperlipidemia     Taking budesonide was concerned about sugar elevation   Can't take NSAIDS with it            Review of Systems    Objective   /74   Pulse 73   Temp 36.5 °C (97.7 °F)   Ht 1.702 m (5' 7\")   Wt 126 kg (277 lb)   SpO2 95%   BMI 43.38 kg/m²     Physical Exam  Constitutional:       Appearance: Normal appearance.   Neurological:      General: No focal deficit present.      Mental Status: She is alert and oriented to person, place, and time.   Psychiatric:         Mood and Affect: Mood normal.         Assessment/Plan   Problem List Items Addressed This Visit    None  Visit Diagnoses         Codes      Myalgia    -  Primary M79.10      Current mild episode of major depressive disorder without prior episode (CMS-HCC)     F32.0               "

## 2025-05-13 NOTE — PATIENT INSTRUCTIONS
Arthralgias: Possibly side effect from the Wellbutrin also possibly the combination of the Wellbutrin and atorvastatin metabolized at the same liver pathway    Stop the statin :  see if it helps with mm aches     Give it 2 weeks,  let me know  how you feel     If MM aches improve we will try rosuvastatin     If aches do NOT I'm proving will change wellbutrin to cymbalta  and see how you do       Ok to take tylenol      Will check sugar again after 3 months     Go ahead and finish out the budesonide, unlikely to have a huge effect on the sugar

## 2025-06-10 DIAGNOSIS — E78.2 HYPERLIPEMIA, MIXED: Primary | ICD-10-CM

## 2025-06-10 RX ORDER — ROSUVASTATIN CALCIUM 10 MG/1
10 TABLET, COATED ORAL DAILY
Qty: 100 TABLET | Refills: 0 | Status: SHIPPED | OUTPATIENT
Start: 2025-06-10 | End: 2026-07-15

## 2025-07-01 ENCOUNTER — APPOINTMENT (OUTPATIENT)
Dept: ORTHOPEDIC SURGERY | Facility: CLINIC | Age: 60
End: 2025-07-01
Payer: COMMERCIAL

## 2025-07-01 DIAGNOSIS — M25.562 PAIN IN BOTH KNEES, UNSPECIFIED CHRONICITY: Primary | ICD-10-CM

## 2025-07-01 DIAGNOSIS — M25.561 PAIN IN BOTH KNEES, UNSPECIFIED CHRONICITY: Primary | ICD-10-CM

## 2025-07-01 PROCEDURE — 99214 OFFICE O/P EST MOD 30 MIN: CPT | Performed by: ORTHOPAEDIC SURGERY

## 2025-07-01 PROCEDURE — 20610 DRAIN/INJ JOINT/BURSA W/O US: CPT | Performed by: ORTHOPAEDIC SURGERY

## 2025-07-01 PROCEDURE — 4004F PT TOBACCO SCREEN RCVD TLK: CPT | Performed by: ORTHOPAEDIC SURGERY

## 2025-07-01 RX ORDER — TRIAMCINOLONE ACETONIDE 40 MG/ML
2.5 INJECTION, SUSPENSION INTRA-ARTICULAR; INTRAMUSCULAR
Status: COMPLETED | OUTPATIENT
Start: 2025-07-01 | End: 2025-07-01

## 2025-07-01 RX ADMIN — TRIAMCINOLONE ACETONIDE 2.5 MG: 40 INJECTION, SUSPENSION INTRA-ARTICULAR; INTRAMUSCULAR at 09:44

## 2025-07-01 ASSESSMENT — PAIN SCALES - GENERAL: PAINLEVEL_OUTOF10: 5 - MODERATE PAIN

## 2025-07-01 ASSESSMENT — PAIN - FUNCTIONAL ASSESSMENT: PAIN_FUNCTIONAL_ASSESSMENT: 0-10

## 2025-07-01 NOTE — PROGRESS NOTES
This is a consultation from Dr. Yin Hamm MD for   Chief Complaint   Patient presents with    Left Knee - Pain    Right Knee - Pain       This is a 59 y.o. female who presents for follow-up for bilateral knees.  Patient has bilateral knee arthritis, she cortisone injections about 3 months ago.  They are helpful and significantly improved her pain.  She has had return of her pain and symptoms over the last few weeks, exacerbation of her pain.  Sharp standing pain over the medial knee worse with walking improving with rest.  No numbness no tingling no fevers no chills no shooting pain down the leg.  She still able to get around and do her normal activities.  She tries to walk up to 2 miles a day.    Physical Exam    There has been no interval change in this patient's past medical, surgical, medications, allergies, family history or social history since the most recent visit to a provider within our department. 14 point review of systems was performed, reviewed, and negative except for pertinent positives documented in the history of present illness.     Constitutional: well developed, well nourished female in no acute distress  Psychiatric: normal mood, appropriate affect  Eyes: sclera anicteric  HENT: normocephalic/atraumatic  CV: regular rate and rhythm   Respiratory: non labored breathing  Integumentary: no rash  Neurological: moves all extremities    Bilateral knee exam: skin intact no lacerations or abrations. no effusion.  Tender medial joint line. negative log roll negative patellar grind. ROM 0-120. stable to varus and valgus stress at 0 and 30 degrees. negative lachman negative posterior drawer negative nidhi. 5/5 ehl/fhl/gs/ta. silt s/s/sp/dp/t. 2+ dp/pt        Imaging:  Xrays were ordered by me, they were reviewed and independently interpreted by me today, they show bilateral knee arthritis    L Inj/Asp: bilateral knee on 7/1/2025 9:44 AM  Indications: pain and joint swelling  Details: 22 G  needle, anterolateral approach  Medications (Right): 2.5 mg triamcinolone acetonide 40 mg/mL  Medications (Left): 2.5 mg triamcinolone acetonide 40 mg/mL    Discussion:  I discussed the conservative treatment options for knee osteoarthritis including but not limited to physical therapy, oral NSAIDS, activity and lifestyle modification, and corticosteroid injections. Pt has elected to undergo a cortisone injection today. I have explained the risk and benefits of an injection including the possibility of joint infection, bleeding, damage to cartilage, allergic reaction. Patient verbalized understanding and gave verbal consent wishes to proceed with a intra-articular cortisone injection for their knee.    Procedure:  After discussing the risk and benefits of the procedure, we proceeded with an intra-articular bilateral knee injection. We discussed the risks and benefits and potential morbidity related to the treatment, and to the prescription medication administered in the injection    With the patient's informed verbal consent, the bilateral knees were prepped in standard sterile fashion with Chlorhexidine. The skin was then anesthetized with ethyl chloride spray and cleaned again with Chlorhexidine. The bilateral knees were then apirated/injected with a prefilled 20-gauge syringe of 40 mg Kenalog + 4 ml Lidocaine using the lateral approach without complications.  The patient tolerated this well and felt immediate initial relief of symptoms. A bandaid was applied and the patient ambulated out of the clinic on ther own accord without difficulty. Patient was instructed to avoid physical activity for 24-48 hours to prevent the knees from swelling and may ice the knees as tolerated. Patient should contact the office if any signs of of infection appear: redness, fever, chills, drainage, swelling or warmth to the knees.  Pt understands that the injections can be repeated no sooner than 3 months.      Procedure, treatment  "alternatives, risks and benefits explained, specific risks discussed. Consent was given by the patient. Immediately prior to procedure a time out was called to verify the correct patient, procedure, equipment, support staff and site/side marked as required. Patient was prepped and draped in the usual sterile fashion.             Impression/Plan: This is a 59 y.o. female with bilateral knee arthritis.  I had an in depth discussion with the patient regarding treatment options for arthritis and their relative risks and benefits. We reviewed surgical and nonsurgical option for treatment. Treatments include anti inflammatory medications, physical therapy, weight loss, activity modification, use of assistive devices, injection therapies. We discussed current prescriptions and risks and benefits of continuation of prescription medication as apporpriate. We discussed that arthritis is often progressive over time, an in end stage arthritis surgical interventions can be considered, including arthroplasty. All questions were answered and the patient voiced their understanding.  Continue injections we will see her back as needed    BMI Readings from Last 1 Encounters:   05/13/25 43.38 kg/m²      Lab Results   Component Value Date    CREATININE 0.82 04/30/2025     Tobacco Use: High Risk (7/1/2025)    Patient History     Smoking Tobacco Use: Every Day     Smokeless Tobacco Use: Never     Passive Exposure: Not on file      Computed MELD 3.0 unavailable. One or more values for this score either were not found within the given timeframe or did not fit some other criterion.  Computed MELD-Na unavailable. One or more values for this score either were not found within the given timeframe or did not fit some other criterion.       Lab Results   Component Value Date    HGBA1C 6.3 (H) 04/30/2025     No results found for: \"STAPHMRSASCR\"  "

## 2025-07-14 ENCOUNTER — OFFICE VISIT (OUTPATIENT)
Dept: GASTROENTEROLOGY | Facility: CLINIC | Age: 60
End: 2025-07-14
Payer: COMMERCIAL

## 2025-07-14 ENCOUNTER — APPOINTMENT (OUTPATIENT)
Facility: CLINIC | Age: 60
End: 2025-07-14
Payer: COMMERCIAL

## 2025-07-14 DIAGNOSIS — K52.832 LYMPHOCYTIC COLITIS: ICD-10-CM

## 2025-07-14 DIAGNOSIS — R19.7 DIARRHEA, UNSPECIFIED TYPE: Primary | ICD-10-CM

## 2025-07-14 PROCEDURE — 4004F PT TOBACCO SCREEN RCVD TLK: CPT | Performed by: INTERNAL MEDICINE

## 2025-07-14 PROCEDURE — 99214 OFFICE O/P EST MOD 30 MIN: CPT | Performed by: INTERNAL MEDICINE

## 2025-07-14 NOTE — LETTER
"July 14, 2025     Yin Hamm MD  [address]  [address]  [facility] [address]    Patient: Rashida Whitfield   YOB: 1965   Date of Visit: 7/14/2025       Dear Dr. Yin Hamm MD:    Thank you for referring Rashida Whitfield to me for evaluation. Below are my notes for this consultation.  If you have questions, please do not hesitate to call me. I look forward to following your patient along with you.       Sincerely,     Bruce Mcallister MD, MS      CC: Roque Johnson MD  ______________________________________________________________________________________    Primary Care Provider: Yin Hamm MD  Referring Provider: Yin Hamm MD  My final recommendations will be communicated back to the referring physician and/or primary care provider via shared medical records or via US mail.    Chief Complaint (Reason for visit):   Rashida Whitfield is a 59 y.o. female who presents for diarrhea    ASSESSMENT AND PLAN     Assessment & Plan    Lymphocytic colitis  Patient has been having diarrhea since mid to end December.     Fecal calprotectin was 783  Stool tests were negative    -Patient needs a colonoscopy anyways  -At some point I will repeat a fecal calprotectin, based on patient's symptoms    3/2025 Cscope - subcm polyps, random colon pth - + for LC    7/14/2025  Pt finished her tapering course of budesonide  She is off of budesonide for a month now  She has no recurrence of her symptoms    -I talked to her about the association of microscopic colitis with medications especially NSAIDs  - She is on NSAIDs for arthritis.  Patient states that she cannot get off of NSAIDs as that is the only group of medicine that helps her keep \"mobile and active\"  - I asked her to minimize NSAIDs as much as she can.  I will also communicate the recommendations with her primary care provider, Yin Hamm MD and her orthopedic doctor, Dr. Devin Johnson  - She will call me back if she has a recurrence of her " diarrhea    Bruce Mcallister MD, MS    SUBJECTIVE   HPI: History obtained from patient    59-year-old female who comes to see me for diarrhea which has been going on since mid-and December.     At baseline patient has 1 solid BM per day  In December, she used to have 4-5 loose watery bowel movements  Now she has 1-2, soft mushy bowel movements.  She has less urgency and no accidents now    Fecal calprotectin was 783  Stool tests were negative    Patient used to take high amount of NSAIDs.  She has cut down on ibuprofen/Advil/Aleve.  She takes oral Voltaren as necessary now.  She has also started taking probiotics    Past Medical History:   Diagnosis Date   • Acute maxillary sinusitis, unspecified 11/30/2020    Acute maxillary sinusitis   • Acute serous otitis media, right ear 03/01/2019    Acute serous otitis media of right ear   • Anxiety disorder, unspecified 09/09/2016    Anxiety   • Anxiety disorder, unspecified 07/14/2014    Anxiety   • Body mass index (BMI) 45.0-49.9, adult (Multi) 03/17/2021    BMI 45.0-49.9, adult   • Contact with and (suspected) exposure to covid-19 11/30/2020    Suspected COVID-19 virus infection   • Diarrhea 09/05/2024   • Heart murmur    • Inappropriate diet and eating habits 08/09/2018    Inappropriate diet and eating habits   • Lateral epicondylitis, right elbow 05/21/2015    Lateral epicondylitis of right elbow   • Menopausal and female climacteric states 03/02/2017    Menopausal symptoms   • Other enthesopathy of left foot and ankle 09/13/2016    Left ankle tendonitis   • Other general symptoms and signs 02/22/2016    Flu-like symptoms   • Pain in left ankle and joints of left foot 09/13/2016    Left ankle pain   • Pain in unspecified ankle and joints of unspecified foot 09/08/2016    Ankle pain   • Personal history of diseases of the skin and subcutaneous tissue     History of guttate psoriasis   • Personal history of diseases of the skin and subcutaneous tissue 03/01/2019    History  of guttate psoriasis   • Personal history of other diseases of the female genital tract 2015    History of dysfunctional uterine bleeding   • Personal history of other diseases of the musculoskeletal system and connective tissue 07/10/2014    History of low back pain   • Personal history of other diseases of the nervous system and sense organs     History of obstructive sleep apnea   • Personal history of other diseases of the respiratory system 2017    History of acute sinusitis   • Personal history of other drug therapy 2020    History of influenza vaccination   • Personal history of other mental and behavioral disorders 2020    History of anxiety disorder   • Personal history of other specified conditions 2020    History of insomnia   • Personal history of other specified conditions 2021    History of headache   • Personal history of other specified conditions 2021    History of urinary frequency   • Personal history of pneumonia (recurrent)     History of pneumonia   • Sleep apnea    • Strain of left Achilles tendon, initial encounter 2017    Partial tear of left Achilles tendon       Past Surgical History:   Procedure Laterality Date   •  SECTION, CLASSIC  2015     Section   • OTHER SURGICAL HISTORY  2015    Treatment Of Ankle Fracture   • OTHER SURGICAL HISTORY  2022    Hand surgery       Current Outpatient Medications   Medication Sig Dispense Refill   • aspirin-acetaminophen-caffeine (Excedrin Migraine) 250-250-65 mg tablet Take 1 tablet by mouth every 6 hours if needed for headaches.     • atorvastatin (Lipitor) 20 mg tablet Take 1 tablet (20 mg) by mouth once daily. 90 tablet 3   • diclofenac (Voltaren) 75 mg EC tablet Take 1 tablet (75 mg) by mouth 2 times a day as needed (pain). Do not crush, chew, or split. 180 tablet 0   • ibuprofen 200 mg tablet Take 1 tablet (200 mg) by mouth every 6 hours.     • multivitamin tablet  "Take 1 tablet by mouth once daily.     • omeprazole (PriLOSEC) 20 mg DR capsule Take by mouth once daily. Unsure of dose.OTC (Patient not taking: Reported on 2/10/2025)       No current facility-administered medications for this visit.       No Known Allergies  OBJECTIVE   PHYSICAL EXAM:  Pulse 87   Ht 1.702 m (5' 7\")   Wt 132 kg (290 lb)   BMI 45.42 kg/m²      LABS/IMAGING/SCOPES  Lab Results   Component Value Date    WBC 8.8 12/26/2024    HGB 13.7 12/26/2024    HCT 43.1 12/26/2024    MCV 93 12/26/2024     12/26/2024     Lab Results   Component Value Date    GLUCOSE 107 (H) 12/26/2024    CALCIUM 8.8 12/26/2024     12/26/2024    K 5.0 12/26/2024    CO2 30 12/26/2024     12/26/2024    BUN 11 12/26/2024    CREATININE 0.95 12/26/2024     Lab Results   Component Value Date    ALT 10 12/26/2024    AST 12 12/26/2024    ALKPHOS 78 12/26/2024    BILITOT 0.3 12/26/2024       === 09/19/24 ===    CT LUNG SCREENING LOW DOSE    - Impression -  1. 3 mm nodules in the left lower lobe as described above and  stable.. Continued screening with low-dose noncontrast chest CT in 12  months (from current date) is recommended.  2. Mild upper lung predominant emphysema.  3. Estimated coronary artery calcium score is 209* which correlates  with at least 95th percentile rank as compared to matched CESPEDES-study  subjects(https://[email or web address]-nhlbi.org/Calcium/input.aspx).  4. Additional stable findings as described above    LUNG RADS CATEGORY:  Lung Rad: Lung-RADS 2 (Benign Appearance or Indolent Behavior)    Recommendation: Continue annual screening with Low Dose Chest CT in  12 months, recommended as per American College of Radiology  Guidelines Lung-RADS Version 2022.        **The patient's CAC score was measured with an FDA-cleared AI tool  that correlates well with traditional methods. However, due to the  non-gated CT scan and new algorithm, AI-powered scores should not  replace traditional cardiovascular risk " assessment. For further  assistance, refer to the [facility] Cardiovascular Prevention  Program via an EPIC referral to 'Cardiology Prevention Program.'    MACRO:  None    Signed by: Brianna Norris 9/19/2024 10:10 PM  Dictation workstation:   [id number]    Bruce Mcallister MD, MS

## 2025-07-14 NOTE — PROGRESS NOTES
"Primary Care Provider: Yin Hamm MD  Referring Provider: Yin Hamm MD  My final recommendations will be communicated back to the referring physician and/or primary care provider via shared medical records or via US mail.    Chief Complaint (Reason for visit):   Rashida Whitfield is a 59 y.o. female who presents for diarrhea    ASSESSMENT AND PLAN     Assessment & Plan    Lymphocytic colitis  Patient has been having diarrhea since mid to end December.     Fecal calprotectin was 783  Stool tests were negative    -Patient needs a colonoscopy anyways  -At some point I will repeat a fecal calprotectin, based on patient's symptoms    3/2025 Cscope - subcm polyps, random colon pth - + for LC    7/14/2025  Pt finished her tapering course of budesonide  She is off of budesonide for a month now  She has no recurrence of her symptoms    -I talked to her about the association of microscopic colitis with medications especially NSAIDs  - She is on NSAIDs for arthritis.  Patient states that she cannot get off of NSAIDs as that is the only group of medicine that helps her keep \"mobile and active\"  - I asked her to minimize NSAIDs as much as she can.  I will also communicate the recommendations with her primary care provider, Yin Hamm MD and her orthopedic doctor, Dr. Devin Johnson  - She will call me back if she has a recurrence of her diarrhea    Bruce Mcallister MD, MS    SUBJECTIVE   HPI: History obtained from patient    59-year-old female who comes to see me for diarrhea which has been going on since mid-and December.     At baseline patient has 1 solid BM per day  In December, she used to have 4-5 loose watery bowel movements  Now she has 1-2, soft mushy bowel movements.  She has less urgency and no accidents now    Fecal calprotectin was 783  Stool tests were negative    Patient used to take high amount of NSAIDs.  She has cut down on ibuprofen/Advil/Aleve.  She takes oral Voltaren as necessary now.  She " has also started taking probiotics    Past Medical History:   Diagnosis Date    Acute maxillary sinusitis, unspecified 11/30/2020    Acute maxillary sinusitis    Acute serous otitis media, right ear 03/01/2019    Acute serous otitis media of right ear    Anxiety disorder, unspecified 09/09/2016    Anxiety    Anxiety disorder, unspecified 07/14/2014    Anxiety    Body mass index (BMI) 45.0-49.9, adult (Multi) 03/17/2021    BMI 45.0-49.9, adult    Contact with and (suspected) exposure to covid-19 11/30/2020    Suspected COVID-19 virus infection    Diarrhea 09/05/2024    Heart murmur     Inappropriate diet and eating habits 08/09/2018    Inappropriate diet and eating habits    Lateral epicondylitis, right elbow 05/21/2015    Lateral epicondylitis of right elbow    Menopausal and female climacteric states 03/02/2017    Menopausal symptoms    Other enthesopathy of left foot and ankle 09/13/2016    Left ankle tendonitis    Other general symptoms and signs 02/22/2016    Flu-like symptoms    Pain in left ankle and joints of left foot 09/13/2016    Left ankle pain    Pain in unspecified ankle and joints of unspecified foot 09/08/2016    Ankle pain    Personal history of diseases of the skin and subcutaneous tissue     History of guttate psoriasis    Personal history of diseases of the skin and subcutaneous tissue 03/01/2019    History of guttate psoriasis    Personal history of other diseases of the female genital tract 11/02/2015    History of dysfunctional uterine bleeding    Personal history of other diseases of the musculoskeletal system and connective tissue 07/10/2014    History of low back pain    Personal history of other diseases of the nervous system and sense organs     History of obstructive sleep apnea    Personal history of other diseases of the respiratory system 03/27/2017    History of acute sinusitis    Personal history of other drug therapy 09/24/2020    History of influenza vaccination    Personal history  "of other mental and behavioral disorders 2020    History of anxiety disorder    Personal history of other specified conditions 2020    History of insomnia    Personal history of other specified conditions 2021    History of headache    Personal history of other specified conditions 2021    History of urinary frequency    Personal history of pneumonia (recurrent)     History of pneumonia    Sleep apnea     Strain of left Achilles tendon, initial encounter 2017    Partial tear of left Achilles tendon       Past Surgical History:   Procedure Laterality Date     SECTION, CLASSIC  2015     Section    OTHER SURGICAL HISTORY  2015    Treatment Of Ankle Fracture    OTHER SURGICAL HISTORY  2022    Hand surgery       Current Outpatient Medications   Medication Sig Dispense Refill    aspirin-acetaminophen-caffeine (Excedrin Migraine) 250-250-65 mg tablet Take 1 tablet by mouth every 6 hours if needed for headaches.      atorvastatin (Lipitor) 20 mg tablet Take 1 tablet (20 mg) by mouth once daily. 90 tablet 3    diclofenac (Voltaren) 75 mg EC tablet Take 1 tablet (75 mg) by mouth 2 times a day as needed (pain). Do not crush, chew, or split. 180 tablet 0    ibuprofen 200 mg tablet Take 1 tablet (200 mg) by mouth every 6 hours.      multivitamin tablet Take 1 tablet by mouth once daily.      omeprazole (PriLOSEC) 20 mg DR capsule Take by mouth once daily. Unsure of dose.OTC (Patient not taking: Reported on 2/10/2025)       No current facility-administered medications for this visit.       No Known Allergies  OBJECTIVE   PHYSICAL EXAM:  Pulse 87   Ht 1.702 m (5' 7\")   Wt 132 kg (290 lb)   BMI 45.42 kg/m²      LABS/IMAGING/SCOPES  Lab Results   Component Value Date    WBC 8.8 2024    HGB 13.7 2024    HCT 43.1 2024    MCV 93 2024     2024     Lab Results   Component Value Date    GLUCOSE 107 (H) 2024    CALCIUM 8.8 " 12/26/2024     12/26/2024    K 5.0 12/26/2024    CO2 30 12/26/2024     12/26/2024    BUN 11 12/26/2024    CREATININE 0.95 12/26/2024     Lab Results   Component Value Date    ALT 10 12/26/2024    AST 12 12/26/2024    ALKPHOS 78 12/26/2024    BILITOT 0.3 12/26/2024       === 09/19/24 ===    CT LUNG SCREENING LOW DOSE    - Impression -  1. 3 mm nodules in the left lower lobe as described above and  stable.. Continued screening with low-dose noncontrast chest CT in 12  months (from current date) is recommended.  2. Mild upper lung predominant emphysema.  3. Estimated coronary artery calcium score is 209* which correlates  with at least 95th percentile rank as compared to matched CESPEDES-study  subjects(https://[email or web address]-nhlbi.org/Calcium/input.aspx).  4. Additional stable findings as described above    LUNG RADS CATEGORY:  Lung Rad: Lung-RADS 2 (Benign Appearance or Indolent Behavior)    Recommendation: Continue annual screening with Low Dose Chest CT in  12 months, recommended as per [employer] College [employer]  Guidelines Lung-RADS Version 2022.        **The patient's CAC score was measured with an FDA-cleared AI tool  that correlates well with traditional methods. However, due to the  non-gated CT scan and new algorithm, AI-powered scores should not  replace traditional cardiovascular risk assessment. For further  assistance, refer to the [facility] Cardiovascular Prevention  Program via an [facility] referral to 'Cardiology Prevention Program.'    MACRO:  None    Signed by: Brianna Norris 9/19/2024 10:10 PM  Dictation workstation:   [id number]    Bruce Mcallister MD, MS

## 2025-07-21 ENCOUNTER — PATIENT MESSAGE (OUTPATIENT)
Dept: GASTROENTEROLOGY | Facility: CLINIC | Age: 60
End: 2025-07-21
Payer: COMMERCIAL

## 2025-07-21 DIAGNOSIS — K52.832 LYMPHOCYTIC COLITIS: ICD-10-CM

## 2025-07-28 DIAGNOSIS — K52.832 LYMPHOCYTIC COLITIS: ICD-10-CM

## 2025-07-28 DIAGNOSIS — R19.7 DIARRHEA, UNSPECIFIED TYPE: Primary | ICD-10-CM

## 2025-08-01 LAB
C COLI+JEJUNI+LARI FUSA STL QL NAA+PROBE: NOT DETECTED
C DIFF TOX GENS STL QL NAA+PROBE: NOT DETECTED
CRYPTOSP AG STL QL IA: NORMAL
EC STX1 GENE STL QL NAA+PROBE: NOT DETECTED
EC STX2 GENE STL QL NAA+PROBE: NOT DETECTED
G LAMBLIA AG STL QL IA: NORMAL
NOROV GI+II ORF1-ORF2 JNC STL QL NAA+PR: NOT DETECTED
O+P STL CONC: NORMAL
O+P STL TRI STN: NORMAL
RVA NSP5 STL QL NAA+PROBE: NOT DETECTED
SALMONELLA SP RPOD STL QL NAA+PROBE: NOT DETECTED
SHIGELLA DNA SPEC QL NAA+PROBE: NOT DETECTED
V CHOL+PARA RFBL+TRKH+TNAA STL QL NAA+PR: NOT DETECTED
Y ENTERO RECN STL QL NAA+PROBE: NOT DETECTED

## 2025-08-01 RX ORDER — BUDESONIDE 3 MG/1
CAPSULE, COATED PELLETS ORAL
Qty: 180 CAPSULE | Refills: 0 | Status: SHIPPED | OUTPATIENT
Start: 2025-08-01

## 2025-08-04 LAB
C COLI+JEJUNI+LARI FUSA STL QL NAA+PROBE: NOT DETECTED
CRYPTOSP AG STL QL IA: NORMAL
EC STX1 GENE STL QL NAA+PROBE: NOT DETECTED
EC STX2 GENE STL QL NAA+PROBE: NOT DETECTED
G LAMBLIA AG STL QL IA: NORMAL
NOROV GI+II ORF1-ORF2 JNC STL QL NAA+PR: NOT DETECTED
O+P STL CONC: NORMAL
O+P STL TRI STN: NORMAL
RVA NSP5 STL QL NAA+PROBE: NOT DETECTED
SALMONELLA SP RPOD STL QL NAA+PROBE: NOT DETECTED
SHIGELLA DNA SPEC QL NAA+PROBE: NOT DETECTED
V CHOL+PARA RFBL+TRKH+TNAA STL QL NAA+PR: NOT DETECTED
Y ENTERO RECN STL QL NAA+PROBE: NOT DETECTED

## 2025-08-13 DIAGNOSIS — Z72.0 TOBACCO ABUSE: ICD-10-CM

## 2025-08-13 RX ORDER — BUPROPION HYDROCHLORIDE 75 MG/1
75 TABLET ORAL 2 TIMES DAILY
Qty: 180 TABLET | Refills: 0 | Status: SHIPPED | OUTPATIENT
Start: 2025-08-13

## 2025-08-14 LAB
C COLI+JEJUNI+LARI FUSA STL QL NAA+PROBE: NOT DETECTED
CRYPTOSP AG STL QL IA: NORMAL
EC STX1 GENE STL QL NAA+PROBE: NOT DETECTED
EC STX2 GENE STL QL NAA+PROBE: NOT DETECTED
G LAMBLIA AG STL QL IA: NORMAL
NOROV GI+II ORF1-ORF2 JNC STL QL NAA+PR: NOT DETECTED
O+P STL TRI STN: NORMAL
RVA NSP5 STL QL NAA+PROBE: NOT DETECTED
SALMONELLA SP RPOD STL QL NAA+PROBE: NOT DETECTED
SHIGELLA DNA SPEC QL NAA+PROBE: NOT DETECTED
V CHOL+PARA RFBL+TRKH+TNAA STL QL NAA+PR: NOT DETECTED
Y ENTERO RECN STL QL NAA+PROBE: NOT DETECTED

## 2025-08-27 ENCOUNTER — TELEPHONE (OUTPATIENT)
Dept: RADIOLOGY | Facility: HOSPITAL | Age: 60
End: 2025-08-27
Payer: COMMERCIAL